# Patient Record
Sex: FEMALE | Race: WHITE | Employment: FULL TIME | URBAN - METROPOLITAN AREA
[De-identification: names, ages, dates, MRNs, and addresses within clinical notes are randomized per-mention and may not be internally consistent; named-entity substitution may affect disease eponyms.]

---

## 2017-01-04 ENCOUNTER — TELEPHONE (OUTPATIENT)
Dept: CARDIOLOGY CLINIC | Age: 57
End: 2017-01-04

## 2017-01-16 ENCOUNTER — OFFICE VISIT (OUTPATIENT)
Dept: CARDIOLOGY CLINIC | Age: 57
End: 2017-01-16

## 2017-01-16 ENCOUNTER — HOSPITAL ENCOUNTER (OUTPATIENT)
Dept: OTHER | Age: 57
Discharge: HOME | End: 2017-01-16
Attending: INTERNAL MEDICINE | Admitting: INTERNAL MEDICINE

## 2017-01-16 VITALS
WEIGHT: 228 LBS | BODY MASS INDEX: 40.4 KG/M2 | HEART RATE: 50 BPM | SYSTOLIC BLOOD PRESSURE: 110 MMHG | HEIGHT: 63 IN | DIASTOLIC BLOOD PRESSURE: 70 MMHG

## 2017-01-16 DIAGNOSIS — G47.33 OBSTRUCTIVE SLEEP APNEA SYNDROME: ICD-10-CM

## 2017-01-16 DIAGNOSIS — I48.91 ATRIAL FIBRILLATION, UNSPECIFIED TYPE (HCC): ICD-10-CM

## 2017-01-16 DIAGNOSIS — R53.83 FATIGUE, UNSPECIFIED TYPE: ICD-10-CM

## 2017-01-16 DIAGNOSIS — E78.2 MIXED HYPERLIPIDEMIA: ICD-10-CM

## 2017-01-16 DIAGNOSIS — I48.91 ATRIAL FIBRILLATION, UNSPECIFIED TYPE (HCC): Primary | ICD-10-CM

## 2017-01-16 LAB
ALT SERPL-CCNC: 26 U/L (ref 10–40)
ANION GAP SERPL CALCULATED.3IONS-SCNC: 15 MMOL/L (ref 3–16)
AST SERPL-CCNC: 14 U/L (ref 15–37)
BUN BLDV-MCNC: 23 MG/DL (ref 7–20)
CALCIUM SERPL-MCNC: 9.1 MG/DL (ref 8.3–10.6)
CHLORIDE BLD-SCNC: 102 MMOL/L (ref 99–110)
CO2: 26 MMOL/L (ref 21–32)
CREAT SERPL-MCNC: 1.4 MG/DL (ref 0.6–1.1)
GFR AFRICAN AMERICAN: 47
GFR NON-AFRICAN AMERICAN: 39
GLUCOSE BLD-MCNC: 90 MG/DL (ref 70–99)
POTASSIUM SERPL-SCNC: 4.1 MMOL/L (ref 3.5–5.1)
SODIUM BLD-SCNC: 143 MMOL/L (ref 136–145)
T4 FREE: 1.1 NG/DL (ref 0.9–1.8)
TSH REFLEX: 4.2 UIU/ML (ref 0.27–4.2)

## 2017-01-16 PROCEDURE — 99214 OFFICE O/P EST MOD 30 MIN: CPT | Performed by: INTERNAL MEDICINE

## 2017-01-16 PROCEDURE — 93000 ELECTROCARDIOGRAM COMPLETE: CPT | Performed by: INTERNAL MEDICINE

## 2017-01-16 RX ORDER — AMIODARONE HYDROCHLORIDE 200 MG/1
200 TABLET ORAL DAILY
Qty: 30 TABLET | Refills: 11 | Status: SHIPPED | OUTPATIENT
Start: 2017-01-16 | End: 2017-04-10 | Stop reason: SDUPTHER

## 2017-01-17 ENCOUNTER — TELEPHONE (OUTPATIENT)
Dept: CARDIOLOGY CLINIC | Age: 57
End: 2017-01-17

## 2017-01-17 DIAGNOSIS — Z79.899 ENCOUNTER FOR LONG-TERM (CURRENT) USE OF MEDICATIONS: ICD-10-CM

## 2017-01-17 DIAGNOSIS — I10 ESSENTIAL HYPERTENSION: Primary | Chronic | ICD-10-CM

## 2017-02-21 ENCOUNTER — HOSPITAL ENCOUNTER (OUTPATIENT)
Dept: OTHER | Age: 57
Discharge: HOME | End: 2017-02-21
Attending: INTERNAL MEDICINE | Admitting: INTERNAL MEDICINE

## 2017-02-21 DIAGNOSIS — Z79.899 ENCOUNTER FOR LONG-TERM (CURRENT) USE OF MEDICATIONS: ICD-10-CM

## 2017-02-21 DIAGNOSIS — I10 ESSENTIAL HYPERTENSION: Chronic | ICD-10-CM

## 2017-02-21 LAB
ANION GAP SERPL CALCULATED.3IONS-SCNC: 14 MMOL/L (ref 3–16)
BUN BLDV-MCNC: 21 MG/DL (ref 7–20)
CALCIUM SERPL-MCNC: 9.1 MG/DL (ref 8.3–10.6)
CHLORIDE BLD-SCNC: 99 MMOL/L (ref 99–110)
CO2: 24 MMOL/L (ref 21–32)
CREAT SERPL-MCNC: 0.8 MG/DL (ref 0.6–1.1)
GFR AFRICAN AMERICAN: >60
GFR NON-AFRICAN AMERICAN: >60
GLUCOSE BLD-MCNC: 89 MG/DL (ref 70–99)
POTASSIUM SERPL-SCNC: 4.3 MMOL/L (ref 3.5–5.1)
SODIUM BLD-SCNC: 137 MMOL/L (ref 136–145)

## 2017-02-22 ENCOUNTER — TELEPHONE (OUTPATIENT)
Dept: CARDIOLOGY CLINIC | Age: 57
End: 2017-02-22

## 2017-04-07 RX ORDER — PANTOPRAZOLE SODIUM 40 MG/1
TABLET, DELAYED RELEASE ORAL
Qty: 30 TABLET | Refills: 3 | Status: SHIPPED | OUTPATIENT
Start: 2017-04-07 | End: 2017-04-08 | Stop reason: SDUPTHER

## 2017-04-10 ENCOUNTER — OFFICE VISIT (OUTPATIENT)
Dept: CARDIOLOGY CLINIC | Age: 57
End: 2017-04-10

## 2017-04-10 VITALS
DIASTOLIC BLOOD PRESSURE: 78 MMHG | HEIGHT: 63 IN | OXYGEN SATURATION: 96 % | BODY MASS INDEX: 42.06 KG/M2 | WEIGHT: 237.4 LBS | HEART RATE: 58 BPM | SYSTOLIC BLOOD PRESSURE: 114 MMHG

## 2017-04-10 DIAGNOSIS — E66.01 MORBID OBESITY DUE TO EXCESS CALORIES (HCC): ICD-10-CM

## 2017-04-10 DIAGNOSIS — I48.0 PAROXYSMAL ATRIAL FIBRILLATION (HCC): Primary | Chronic | ICD-10-CM

## 2017-04-10 DIAGNOSIS — G47.33 OSA (OBSTRUCTIVE SLEEP APNEA): ICD-10-CM

## 2017-04-10 DIAGNOSIS — I48.92 ATRIAL FLUTTER, UNSPECIFIED TYPE (HCC): Chronic | ICD-10-CM

## 2017-04-10 DIAGNOSIS — E78.2 MIXED HYPERLIPIDEMIA: Chronic | ICD-10-CM

## 2017-04-10 PROCEDURE — 93000 ELECTROCARDIOGRAM COMPLETE: CPT | Performed by: INTERNAL MEDICINE

## 2017-04-10 PROCEDURE — 99214 OFFICE O/P EST MOD 30 MIN: CPT | Performed by: INTERNAL MEDICINE

## 2017-04-10 RX ORDER — AMIODARONE HYDROCHLORIDE 200 MG/1
100 TABLET ORAL DAILY
Qty: 30 TABLET | Refills: 11
Start: 2017-04-10 | End: 2017-07-10 | Stop reason: ALTCHOICE

## 2017-04-10 RX ORDER — PANTOPRAZOLE SODIUM 40 MG/1
TABLET, DELAYED RELEASE ORAL
Qty: 30 TABLET | Refills: 3 | Status: SHIPPED | OUTPATIENT
Start: 2017-04-10 | End: 2017-10-04 | Stop reason: SDUPTHER

## 2017-04-17 PROCEDURE — 93224 XTRNL ECG REC UP TO 48 HRS: CPT | Performed by: INTERNAL MEDICINE

## 2017-06-26 ENCOUNTER — OFFICE VISIT (OUTPATIENT)
Dept: CARDIOLOGY CLINIC | Age: 57
End: 2017-06-26

## 2017-06-26 VITALS
SYSTOLIC BLOOD PRESSURE: 138 MMHG | DIASTOLIC BLOOD PRESSURE: 80 MMHG | HEIGHT: 63 IN | HEART RATE: 64 BPM | WEIGHT: 240.13 LBS | BODY MASS INDEX: 42.55 KG/M2

## 2017-06-26 DIAGNOSIS — G47.33 OSA (OBSTRUCTIVE SLEEP APNEA): ICD-10-CM

## 2017-06-26 DIAGNOSIS — E78.2 MIXED HYPERLIPIDEMIA: Chronic | ICD-10-CM

## 2017-06-26 DIAGNOSIS — I48.0 PAROXYSMAL ATRIAL FIBRILLATION (HCC): Primary | Chronic | ICD-10-CM

## 2017-06-26 LAB
ALT SERPL-CCNC: 32 IU/L (ref 10–35)
AST SERPL-CCNC: 19 IU/L (ref 10–35)
BUN BLDV-MCNC: 20 MG/DL (ref 8–26)
CALCIUM SERPL-MCNC: 8.6 MG/DL (ref 8.4–10.2)
CHLORIDE BLD-SCNC: 103 MEQ/L (ref 101–111)
CO2: 24 MEQ/L (ref 22–29)
CREAT SERPL-MCNC: 0.97 MG/DL (ref 0.44–1.03)
GFR AFRICAN AMERICAN: >60 ML/MIN/1.73 SQ METER
GFR NON-AFRICAN AMERICAN: 59 ML/MIN/1.73 SQ METER
GLUCOSE BLD-MCNC: 97 MG/DL (ref 70–99)
OSMOLALITY CALCULATION: 269 MOSM/KG (ref 280–300)
POTASSIUM SERPL-SCNC: 3.8 MEQ/L (ref 3.6–5.1)
SODIUM BLD-SCNC: 138 MEQ/L (ref 135–145)
TSH ULTRASENSITIVE: 3.76 MIU/L (ref 0.27–4.2)

## 2017-06-26 PROCEDURE — 99213 OFFICE O/P EST LOW 20 MIN: CPT | Performed by: INTERNAL MEDICINE

## 2017-06-28 LAB
AMIODARONE, SERUM: 0.4 UG/ML (ref 0.5–2)
N-DESETHYL-AMIODARONE: 0.4 UG/ML

## 2017-07-03 ENCOUNTER — TELEPHONE (OUTPATIENT)
Dept: BARIATRICS/WEIGHT MGMT | Age: 57
End: 2017-07-03

## 2017-07-10 ENCOUNTER — OFFICE VISIT (OUTPATIENT)
Dept: CARDIOLOGY CLINIC | Age: 57
End: 2017-07-10

## 2017-07-10 VITALS
WEIGHT: 241 LBS | OXYGEN SATURATION: 95 % | HEIGHT: 63 IN | SYSTOLIC BLOOD PRESSURE: 112 MMHG | DIASTOLIC BLOOD PRESSURE: 68 MMHG | BODY MASS INDEX: 42.7 KG/M2 | HEART RATE: 64 BPM

## 2017-07-10 DIAGNOSIS — E66.01 MORBID OBESITY DUE TO EXCESS CALORIES (HCC): ICD-10-CM

## 2017-07-10 DIAGNOSIS — G47.33 OSA (OBSTRUCTIVE SLEEP APNEA): ICD-10-CM

## 2017-07-10 DIAGNOSIS — I48.0 PAROXYSMAL ATRIAL FIBRILLATION (HCC): Primary | Chronic | ICD-10-CM

## 2017-07-10 DIAGNOSIS — I10 ESSENTIAL HYPERTENSION: Chronic | ICD-10-CM

## 2017-07-10 PROCEDURE — 99214 OFFICE O/P EST MOD 30 MIN: CPT | Performed by: INTERNAL MEDICINE

## 2017-07-10 PROCEDURE — 93000 ELECTROCARDIOGRAM COMPLETE: CPT | Performed by: INTERNAL MEDICINE

## 2017-07-10 RX ORDER — METOPROLOL TARTRATE 50 MG/1
TABLET, FILM COATED ORAL
Qty: 180 TABLET | Refills: 3 | Status: SHIPPED | OUTPATIENT
Start: 2017-07-10 | End: 2018-02-07 | Stop reason: SDUPTHER

## 2017-07-17 ENCOUNTER — OFFICE VISIT (OUTPATIENT)
Dept: BARIATRICS/WEIGHT MGMT | Age: 57
End: 2017-07-17

## 2017-07-17 VITALS
HEIGHT: 63 IN | WEIGHT: 242 LBS | SYSTOLIC BLOOD PRESSURE: 124 MMHG | BODY MASS INDEX: 42.88 KG/M2 | HEART RATE: 60 BPM | DIASTOLIC BLOOD PRESSURE: 80 MMHG

## 2017-07-17 DIAGNOSIS — I10 HTN (HYPERTENSION), BENIGN: ICD-10-CM

## 2017-07-17 DIAGNOSIS — I48.0 PAROXYSMAL ATRIAL FIBRILLATION (HCC): ICD-10-CM

## 2017-07-17 DIAGNOSIS — E66.01 MORBID OBESITY WITH BMI OF 40.0-44.9, ADULT (HCC): Primary | ICD-10-CM

## 2017-07-17 DIAGNOSIS — G47.33 OBSTRUCTIVE SLEEP APNEA: ICD-10-CM

## 2017-07-17 PROCEDURE — 99204 OFFICE O/P NEW MOD 45 MIN: CPT | Performed by: FAMILY MEDICINE

## 2017-07-17 ASSESSMENT — ENCOUNTER SYMPTOMS
RESPIRATORY NEGATIVE: 1
GASTROINTESTINAL NEGATIVE: 1
EYES NEGATIVE: 1

## 2017-07-27 ENCOUNTER — TELEPHONE (OUTPATIENT)
Dept: BARIATRICS/WEIGHT MGMT | Age: 57
End: 2017-07-27

## 2017-07-27 DIAGNOSIS — R73.03 PREDIABETES: ICD-10-CM

## 2017-07-27 DIAGNOSIS — E55.9 VITAMIN D DEFICIENCY: Primary | ICD-10-CM

## 2017-07-27 RX ORDER — ERGOCALCIFEROL 1.25 MG/1
50000 CAPSULE ORAL WEEKLY
Qty: 8 CAPSULE | Refills: 0 | Status: SHIPPED | OUTPATIENT
Start: 2017-07-27 | End: 2017-10-06

## 2017-08-21 ENCOUNTER — OFFICE VISIT (OUTPATIENT)
Dept: BARIATRICS/WEIGHT MGMT | Age: 57
End: 2017-08-21

## 2017-08-21 VITALS
HEART RATE: 64 BPM | WEIGHT: 243.5 LBS | HEIGHT: 63 IN | DIASTOLIC BLOOD PRESSURE: 80 MMHG | SYSTOLIC BLOOD PRESSURE: 128 MMHG | BODY MASS INDEX: 43.14 KG/M2

## 2017-08-21 DIAGNOSIS — R73.03 PREDIABETES: ICD-10-CM

## 2017-08-21 DIAGNOSIS — Z71.3 DIETARY COUNSELING AND SURVEILLANCE: ICD-10-CM

## 2017-08-21 DIAGNOSIS — E55.9 VITAMIN D DEFICIENCY: ICD-10-CM

## 2017-08-21 DIAGNOSIS — E66.01 MORBID OBESITY WITH BMI OF 40.0-44.9, ADULT (HCC): Primary | ICD-10-CM

## 2017-08-21 PROCEDURE — 99214 OFFICE O/P EST MOD 30 MIN: CPT | Performed by: FAMILY MEDICINE

## 2017-08-21 ASSESSMENT — ENCOUNTER SYMPTOMS
EYES NEGATIVE: 1
RESPIRATORY NEGATIVE: 1
GASTROINTESTINAL NEGATIVE: 1

## 2017-10-04 DIAGNOSIS — E78.2 MIXED HYPERLIPIDEMIA: Primary | Chronic | ICD-10-CM

## 2017-10-05 RX ORDER — PANTOPRAZOLE SODIUM 40 MG/1
40 TABLET, DELAYED RELEASE ORAL DAILY
Qty: 90 TABLET | Refills: 3 | Status: SHIPPED | OUTPATIENT
Start: 2017-10-05 | End: 2018-09-23 | Stop reason: SDUPTHER

## 2017-10-06 ENCOUNTER — OFFICE VISIT (OUTPATIENT)
Dept: BARIATRICS/WEIGHT MGMT | Age: 57
End: 2017-10-06

## 2017-10-06 VITALS
WEIGHT: 236.5 LBS | HEIGHT: 63 IN | BODY MASS INDEX: 41.9 KG/M2 | DIASTOLIC BLOOD PRESSURE: 80 MMHG | HEART RATE: 56 BPM | SYSTOLIC BLOOD PRESSURE: 122 MMHG

## 2017-10-06 DIAGNOSIS — Z71.3 DIETARY COUNSELING AND SURVEILLANCE: ICD-10-CM

## 2017-10-06 DIAGNOSIS — E66.01 MORBID OBESITY WITH BMI OF 40.0-44.9, ADULT (HCC): Primary | ICD-10-CM

## 2017-10-06 PROCEDURE — 99213 OFFICE O/P EST LOW 20 MIN: CPT | Performed by: FAMILY MEDICINE

## 2017-10-06 RX ORDER — MULTIVIT-MIN/IRON/FOLIC ACID/K 18-600-40
1 CAPSULE ORAL DAILY
COMMUNITY

## 2017-10-06 ASSESSMENT — ENCOUNTER SYMPTOMS
EYES NEGATIVE: 1
RESPIRATORY NEGATIVE: 1
GASTROINTESTINAL NEGATIVE: 1

## 2017-10-06 NOTE — PATIENT INSTRUCTIONS
Learning About Obesity  What is obesity? Obesity means having so much body fat that your health is in danger. Having too much body fat can lead to type 2 diabetes, heart disease, high blood pressure, arthritis, sleep apnea, and stroke. Even if you don't feel bad now, think about these health risks. Do they seem like a good reason to start on a new path toward a healthier weight? Or do you have another personal, powerful reason for wanting to lose weight? Whatever it is, keep it in mind. It can be hard to change eating habits and exercise habits. But with your own reason and plan, you can do it. How do you know if your weight is in the obesity range? To know if your weight is in the obesity range, your doctor looks at your body mass index (BMI) and waist size. Your BMI is a number that is calculated from your weight and your height. To figure your BMI for yourself, get a BMI table from your doctor or use an online tool, such as http://www.dumont.com/ on the [employer] of [employer]. What causes obesity? When you take in more calories than you burn off, you gain weight. How you eat, how active you are, and other things affect how your body uses calories and whether you gain weight. If you have family members who have too much body fat, you may have inherited a tendency to gain weight. And your family also helps form your eating and lifestyle habits, which can lead to obesity. Also, our busy lives make it harder to plan and cook healthy meals. For many of us, it's easier to reach for prepared foods, go out to eat, or go to the drive-through. But these foods are often high in saturated fat and calories. Portions are often too large. What can you do to reach a healthy weight? Focus on health, not diets. Diets are hard to stay on and don't work in the long run.  It is very hard to stay with a diet that includes lots of big changes in your eating

## 2017-10-06 NOTE — PROGRESS NOTES
Patient: Stan Monet                      Encounter Date: 10/6/2017    YOB: 1960               Age: 60 y.o. Chief Complaint   Patient presents with    Weight Management     3rd MWM, Optifast       /80  Pulse 56  Ht 5' 3\" (1.6 m)  Wt 236 lb 8 oz (107.3 kg)  BMI 41.89 kg/m2    Body mass index is 41.89 kg/(m^2). Waist Circumference  Waist (Inches): 55 in    HPI: 60 y.o. female with a long-standing history of obesity presents today for follow-up. She has lost 7 pounds since her last visit in August. She did not like OPTIFAST (had issues with the smell and taste). Would like to know medication options. Met with the dietitian today. Food recall and assessment reviewed with the patient. Exercise: []Cardio     []Resistance/strength training     [x]Other: No intentional exercise, but trying to be physically active     Allergies   Allergen Reactions    Vicodin [Hydrocodone-Acetaminophen] Nausea And Vomiting         Current Outpatient Prescriptions:     pantoprazole (PROTONIX) 40 MG tablet, Take 1 tablet by mouth daily, Disp: 90 tablet, Rfl: 3    Probiotic Product (PROBIOTIC PO), Take by mouth, Disp: , Rfl:     metoprolol tartrate (LOPRESSOR) 50 MG tablet, TAKE 1 TABLET BY MOUTH 2 TIMES DAILY. , Disp: 180 tablet, Rfl: 3    XARELTO 20 MG TABS tablet, TAKE 1 TABLET BY MOUTH DAILY, Disp: 30 tablet, Rfl: 11    ALPRAZolam (XANAX) 0.25 MG tablet, Take 0.25 mg by mouth daily as needed , Disp: , Rfl:     sertraline (ZOLOFT) 50 MG tablet, Take 50 mg by mouth daily , Disp: , Rfl:     simvastatin (ZOCOR) 40 MG tablet, Take 40 mg by mouth nightly.  , Disp: , Rfl:     Cholecalciferol (VITAMIN D) 2000 units CAPS capsule, Take 1 capsule by mouth daily, Disp: , Rfl:     Patient Active Problem List   Diagnosis    Essential hypertension    SUSIE (obstructive sleep apnea)    Paroxysmal atrial fibrillation (HCC)    Hyperlipidemia    Atrial flutter (HCC)    Obesity due to excess calories    Vitamin D deficiency    Prediabetes       Review of Systems   HENT: Negative. Eyes: Negative. Respiratory: Negative. Cardiovascular: Negative. Gastrointestinal: Negative. Endocrine: Negative. Musculoskeletal: Negative. Neurological: Negative. Psychiatric/Behavioral: Negative. Physical Exam   Constitutional: She is oriented to person, place, and time. She appears well-developed and well-nourished. Cardiovascular: Normal rate, regular rhythm and normal heart sounds. Pulmonary/Chest: Effort normal and breath sounds normal. No respiratory distress. She has no wheezes. She has no rales. Neurological: She is alert and oriented to person, place, and time. Skin: Skin is warm and dry. Psychiatric: She has a normal mood and affect. Her behavior is normal. Judgment and thought content normal.       Office Visit on 06/26/2017   Component Date Value Ref Range Status    ALT 06/26/2017 32  10 - 35 IU/L Final    AST 06/26/2017 19  10 - 35 IU/L Final    BUN 06/26/2017 20  8 - 26 mg/dL Final    Sodium 06/26/2017 138  135 - 145 mEq/L Final    Potassium 06/26/2017 3.8  3.6 - 5.1 mEq/L Final    Chloride 06/26/2017 103  101 - 111 mEq/L Final    CO2 06/26/2017 24  22 - 29 mEq/L Final    Glucose 06/26/2017 97  70 - 99 mg/dL Final    CREATININE 06/26/2017 0.97  0.44 - 1.03 mg/dL Final    Comment: Results of Creatinine results may be falsely decreased when run with urine or blood samples   collected before sufficient time has lapsed after the administration of either Acetaminophen   or N-acetylcysteine.  Osmolality Calc 06/26/2017 269* 280 - 300 mOSM/kg Final    Calcium 06/26/2017 8.6  8.4 - 10.2 mg/dL Final    GFR Non- 06/26/2017 59* >60 mL/min/1.73 sq meter Final    GFR  06/26/2017 >60  >60 mL/min/1.73 sq meter Final    Comment: The estimated GFR was calculated using the IDMS-Traceable MDRD Study Equation.  This equation   should only be used for (low carb/low-debby)               [] Low-calorie diet    []Maintenance       []Other    Exercise: [x]Cardio     [x]Resistance/strength training                       [x]ACSM recommendations (150 minutes/week in active weight loss)                              Behavior: [x]Motivational interviewing performed    [] Referral for counseling                         [x]Discussed strategies to overcome habits/challenges for focus         [] Stress management   [x] Stimulus control    Reviewed:  [x] Nutrition and the importance of regular protein intake  [x] Hidden carbohydrate sources  [x] Alcohol use  [x] Tobacco use   [x] Importance of exercise and reducing sedentary time      No orders of the defined types were placed in this encounter. No Follow-up on file. This dictation was performed with a verbal recognition program (Dragon) and all efforts were made to ensure accuracy of this dictation. It is possible that there are still dictated errors within this note. If so, please bring any errors to my attention for correction.

## 2017-10-06 NOTE — MR AVS SNAPSHOT
After Visit Summary             Mounika Mast   10/6/2017 10:30 AM   Office Visit    Description:  Female : 1960   Provider:  Elena Webber MD   Department:  [facility]              Your Follow-Up and Future Appointments         Below is a list of your follow-up and future appointments. This may not be a complete list as you may have made appointments directly with providers that we are not aware of or your providers may have made some for you. Please call your providers to confirm appointments. It is important to keep your appointments. Please bring your current insurance card, photo ID, co-pay, and all medication bottles to your appointment. If self-pay, payment is expected at the time of service. Your To-Do List     Future Appointments Provider Department Dept Phone    10/10/2017 1:15 PM Naseem Ocasio MD [address] [place] [address] [phone]    Please arrive 15 minutes prior to appointment, bring photo ID and insurance card. 2017 4:45 PM Elena Webber MD [facility] [phone]    Please arrive 15 minutes prior to appointment time, bring insurance card and photo ID. Follow-Up    Return in about 6 weeks (around 2017). Information from Your Visit        Department     Name Address Phone Fax    [address] [place] [address] [employer] [address] [address] [address] [place] [address],Third Floor [phone]      You Were Seen for:         Comments    Morbid obesity with BMI of 40.0-44.9, adult [facility])   [[id number]]         Vital Signs     Blood Pressure Pulse Height Weight Body Mass Index Smoking Status    122/80 56 5' 3\" (1.6 m) 236 lb 8 oz (107.3 kg) 41.89 kg/m2 Former Smoker      Additional Information about your Body Mass Index (BMI)           Your BMI as listed above is considered obese (30 or more). BMI is an estimate of body fat, calculated from your height and weight.   The higher your BMI, the greater your risk of heart disease, high blood pressure, type 2 diabetes, stroke, gallstones, arthritis, sleep apnea, and certain cancers. BMI is not perfect. It may overestimate body fat in athletes and people who are more muscular. Even a small weight loss (between 5 and 10 percent of your current weight) by decreasing your calorie intake and becoming more physically active will help lower your risk of developing or worsening diseases associated with obesity. Learn more at: [employer].uk          Instructions         Learning About Obesity  What is obesity? Obesity means having so much body fat that your health is in danger. Having too much body fat can lead to type 2 diabetes, heart disease, high blood pressure, arthritis, sleep apnea, and stroke. Even if you don't feel bad now, think about these health risks. Do they seem like a good reason to start on a new path toward a healthier weight? Or do you have another personal, powerful reason for wanting to lose weight? Whatever it is, keep it in mind. It can be hard to change eating habits and exercise habits. But with your own reason and plan, you can do it. How do you know if your weight is in the obesity range? To know if your weight is in the obesity range, your doctor looks at your body mass index (BMI) and waist size. Your BMI is a number that is calculated from your weight and your height. To figure your BMI for yourself, get a BMI table from your doctor or use an online tool, such as http://www.[employer]ing Systems/ on the Automatic Data of [employer]. What causes obesity? When you take in more calories than you burn off, you gain weight. How you eat, how active you are, and other things affect how your body uses calories and whether you gain weight.   If you have family members who have too much body fat, you may have daily candy bar to a piece of fruit, walk 10 minutes more, or add more vegetables to a meal.  Line up your support people. Make sure you're not going to be alone as you make this change. Connect with people who understand how important it is to you. Ask family members and friends for help in keeping with your plan. And think about who could make it harder for you, and how to handle them. Try tracking. People who keep track of what they eat, feel, and do are better at losing weight. Try writing down things like:  · What and how much you eat. · How you feel before and after each meal.  · Details about each meal (like eating out or at home, eating alone, or with friends or family). · What you do to be active. Look and plan. As you track, look for patterns that you may want to change. Take note of:  · When you eat and whether you skip meals. · How often you eat out. · How many fruits and vegetables you eat. · When you eat beyond feeling full. · When and why you eat for reasons other than being hungry. When you stray from your plan, don't get upset. Figure out what made you slip up and how you can fix it. Can you take medicines or have surgery to lose weight? Before your doctor will prescribe medicines or surgery, he or she will probably want you to be more active and follow your healthy eating plan for a period of time. These habits are key lifelong changes for managing your weight, with or without other medical treatment. And these changes can help you avoid weight-related health problems. Follow-up care is a key part of your treatment and safety. Be sure to make and go to all appointments, and call your doctor if you are having problems. It's also a good idea to know your test results and keep a list of the medicines you take. Where can you learn more? Go to https://tex.MediKeeper. org and sign in to your [employer] Insight account.  Enter N111 in the AgeCheq box to learn more about \"Learning About Obesity. \"     If you do not have an account, please click on the \"Sign Up Now\" link. Current as of: October 13, 2016  Content Version: 11.3  © 6618-7263 [employer]. Care instructions adapted under license by [facility] ([facility]). If you have questions about a medical condition or this instruction, always ask your healthcare professional. [address] any warranty or liability for your use of this information. Today's Medication Changes          These changes are accurate as of: 10/6/17  1:04 PM.  If you have any questions, ask your nurse or doctor. STOP taking these medications           vitamin D 01859 units Caps capsule   Commonly known as:  ERGOCALCIFEROL   Stopped by:  Jennifer Vasquez MD               Your Current Medications Are              pantoprazole (PROTONIX) 40 MG tablet Take 1 tablet by mouth daily    Probiotic Product (PROBIOTIC PO) Take by mouth    metoprolol tartrate (LOPRESSOR) 50 MG tablet TAKE 1 TABLET BY MOUTH 2 TIMES DAILY. XARELTO 20 MG TABS tablet TAKE 1 TABLET BY MOUTH DAILY    ALPRAZolam (XANAX) 0.25 MG tablet Take 0.25 mg by mouth daily as needed     sertraline (ZOLOFT) 50 MG tablet Take 50 mg by mouth daily     simvastatin (ZOCOR) 40 MG tablet Take 40 mg by mouth nightly.       Cholecalciferol (VITAMIN D) 2000 units CAPS capsule Take 1 capsule by mouth daily      Allergies              Vicodin [Hydrocodone-acetaminophen] Nausea And Vomiting         Additional Information        Basic Information     Date Of Birth Sex Race Ethnicity Preferred Language    1960 Female White Non-/Non  English      Problem List as of 10/6/2017  Date Reviewed: 10/6/2017                Vitamin D deficiency    Prediabetes    Atrial flutter ([facility] [address] (Chronic)    Obesity due to excess calories    Essential hypertension (Chronic)    SUSIE (obstructive sleep apnea)    Paroxysmal atrial fibrillation (HCC) (Chronic) Hyperlipidemia (Chronic)      Preventive Care        Date Due    Hepatitis C screening is recommended for all adults regardless of risk factors born between [facility] at least once (lifetime) who have never been tested. 1960    HIV screening is recommended for all people regardless of risk factors  aged 15-65 years at least once (lifetime) who have never been HIV tested. 12/14/1973    Tetanus Combination Vaccine (1 - Tdap) 12/14/1977    Pap Smear 12/14/1979    Mammograms are recommended every 2 years for low/average risk patients aged 48 - 69, and every year for high risk patients per updated national guidelines. However these guidelines can be individualized by your provider. 12/14/2008    Colonoscopy 12/14/2008    Yearly Flu Vaccine (1) 9/1/2017    Cholesterol Screening 6/2/2021            TYT ([employer] Young Turks)t Signup           Our records indicate that you have an active [employer] account. You can view your After Visit Summary by going to [email or web address] and logging in with your [employer] username and password. If you don't have a [employer] username and password but a parent or guardian has access to your record, the parent or guardian should login with their own [employer] username and password and access your record to view the After Visit Summary. Additional Information  If you have questions, please contact the physician practice where you receive care. Remember, [employer] is NOT to be used for urgent needs. For medical emergencies, dial 911. For questions regarding your [employer] account call [phone]. If you have a clinical question, please call your doctor's office.

## 2017-10-06 NOTE — PROGRESS NOTES
Lobo Major lost 7 lbs over 1 month. Pt states she does not like the taste of the Optifast and wants to switch to medication if possible. Pt states she is struggling with portions. Current treatment plan:   Patient is not on medication. Negative side effects from medications? no  Patient is on Optifast.   Patient is not on diet and exercise only plan. Treatment plan details: Optifast 4:1 - Pt states she followed this for 2.5 weeks and then started struggling and overeating at her meal    Is patient adhering to diet/meal plan?: No    Carbohydrate consumption: pt not tracking    Breakfast: 7 am - Atkins shake OR Quest bar     Snack: none    Lunch: 10 am - optifast soup OR atkins shake     Snack: 2 pm - apple OR atkins shake     Dinner: salad with protein and zucchini  OR dines out and has large salad with protein     Snack: Atkins/Optifast/Quest bar OR none     Consuming at least 64oz of calorie free fluids? Yes    Participating in intentional exercise? Yes Details: Walks her small dog 4 x week     Plan/Goals:  Work on decreasing portion sizes,  Work on getting more variety in your diet     Handouts: 1200 kcal meal plan    Liza Lambert

## 2017-10-10 ENCOUNTER — OFFICE VISIT (OUTPATIENT)
Dept: CARDIOLOGY CLINIC | Age: 57
End: 2017-10-10

## 2017-10-10 VITALS
OXYGEN SATURATION: 96 % | SYSTOLIC BLOOD PRESSURE: 130 MMHG | WEIGHT: 237.8 LBS | DIASTOLIC BLOOD PRESSURE: 70 MMHG | HEART RATE: 60 BPM | BODY MASS INDEX: 42.13 KG/M2 | HEIGHT: 63 IN

## 2017-10-10 DIAGNOSIS — E66.01 MORBID OBESITY DUE TO EXCESS CALORIES (HCC): ICD-10-CM

## 2017-10-10 DIAGNOSIS — I10 ESSENTIAL HYPERTENSION: Chronic | ICD-10-CM

## 2017-10-10 DIAGNOSIS — I48.92 ATRIAL FLUTTER, UNSPECIFIED TYPE (HCC): Chronic | ICD-10-CM

## 2017-10-10 DIAGNOSIS — I48.0 PAROXYSMAL ATRIAL FIBRILLATION (HCC): Primary | Chronic | ICD-10-CM

## 2017-10-10 DIAGNOSIS — G47.33 OSA (OBSTRUCTIVE SLEEP APNEA): ICD-10-CM

## 2017-10-10 PROCEDURE — 99214 OFFICE O/P EST MOD 30 MIN: CPT | Performed by: INTERNAL MEDICINE

## 2017-10-10 PROCEDURE — 93000 ELECTROCARDIOGRAM COMPLETE: CPT | Performed by: INTERNAL MEDICINE

## 2017-10-10 NOTE — PROGRESS NOTES
[facility]   Electrophysiology Follow up  Date: 10/10/2017    CC: Follow up for atrial fibrillation  HPI: Mounika Mast is a 60 y.o. History of paroxysmal atrial fibrillation. Atrial fib first started about around 2010, and she was symptomatic with it. Treated with Rythmol but continued having symptoms. On 12/15/16 - s/p ablation of Atrial fib with PVI, ablation of CVTI atrial flutter. 48 hr holter monitor 4/10/17 > SR with avg HR 61 (). Antiarrhythmic therapy Amiodarone was discontinued last visit in July. Interval history: Today she questions whether she still needs to take Protonix prescribed after ablation. She denies GI issues currently. She will switch off Zoloft to Contrave to help with weight loss. She follows with [facility]. She has lost ~ 7 lbs so far. She denies bleeding or unusual bruising on Xarelto. She avoids working with glass when on NOAC. She denies any palpitation, chest pain, SOB. Past Medical History:   Diagnosis Date    A-fib ([address]     Anxiety     COPD (chronic obstructive pulmonary disease) (HCC)     Depression     Heart palpitations     Hyperlipidemia     Hypertension     Sleep apnea     Urinary incontinence         Past Surgical History:   Procedure Laterality Date    ATRIAL ABLATION SURGERY      BREAST BIOPSY      CARPAL TUNNEL RELEASE      CYST REMOVAL      both breast    THORACOTOMY         Allergies   Allergen Reactions    Vicodin [Hydrocodone-Acetaminophen] Nausea And Vomiting       Social History:   reports that she quit smoking about 7 years ago. She has never used smokeless tobacco. She reports that she does not drink alcohol or use drugs.      Family History:  family history includes Cancer in her maternal grandfather, paternal grandfather, and sister; Diabetes in her maternal grandmother and mother; Glaucoma in her father; Heart Disease in her mother; High Blood Pressure in her mother; High Cholesterol in her brother, mother, and sister; Stroke in her mother. Review of System:    · General: negative for fever, chills    · Ophthalmic ROS: negative for - eye pain or loss of vision  · ENT ROS: negative for - headaches, sore throat   · Respiratory: negative for - cough, sputum    · Cardiovascular: Reviewed in HPI. · Gastrointestinal: negative for - abdominal pain, diarrhea, N/V  · Hematology: negative for - bleeding, blood clots, bruising or jaundice  · Genito-Urinary:  negative for - Dysuria or incontinence  · Musculoskeletal: negative for - Joint swelling, muscle pain  · Neurological: negative for - confusion, dizziness, headaches   · Psychiatric: No anxiety, no depression. · Dermatological: negative for - rash    Physical Examination:  Vitals:    10/10/17 1307   BP: 130/70   Pulse: 60   SpO2: 96%       · Constitutional: Oriented. No distress. · Head: Normocephalic and atraumatic. · Mouth/Throat: Oropharynx is clear and moist.   · Eyes: Conjunctivae normal. EOM are normal.   · Neck: Normal range of motion. No JVD present. · Cardiovascular: Normal rate, regular rhythm, S1&S2 without murmur, gallop, or rub  · Pulmonary/Chest: Bilateral respiratory sounds. No wheezes. No rhonchi. · Abdominal: Soft. Bowel sounds present. + obese   · Musculoskeletal: No tenderness. No edema    · Lymphadenopathy: Has no cervical adenopathy. · Neurological: Alert and oriented. No Gross deficit   · Skin: Skin is warm and dry. No rash noted. · Psychiatric: Has a normal behavior     Labs:  Reviewed. ECG:  Sinus rhythm, 56 bpm   Echo/stress: 2013  Echo   Baseline echocardiogram shows normal LV function with an ejection fraction   of 55%.  Following exercise there was uniform augmentation of all segments   with appropriate hyperdynamic response to exercise.    ECG   Normal sinus rhythm.   Non-diagnostic due to baseline abnormalities.    Arrhythmias   No arrhythmias were observed during the test.     Medication:  Current Outpatient Prescriptions   Medication Sig Dispense Refill    Cholecalciferol (VITAMIN D) 2000 units CAPS capsule Take 1 capsule by mouth daily      pantoprazole (PROTONIX) 40 MG tablet Take 1 tablet by mouth daily 90 tablet 3    Probiotic Product (PROBIOTIC PO) Take by mouth      metoprolol tartrate (LOPRESSOR) 50 MG tablet TAKE 1 TABLET BY MOUTH 2 TIMES DAILY. 180 tablet 3    XARELTO 20 MG TABS tablet TAKE 1 TABLET BY MOUTH DAILY 30 tablet 11    ALPRAZolam (XANAX) 0.25 MG tablet Take 0.25 mg by mouth daily as needed       sertraline (ZOLOFT) 50 MG tablet Take 50 mg by mouth daily       simvastatin (ZOCOR) 40 MG tablet Take 40 mg by mouth nightly. No current facility-administered medications for this visit. Patient Active Problem List    Diagnosis Date Noted    Vitamin D deficiency 07/27/2017    Prediabetes 07/27/2017    Atrial flutter ([facility] [address]     Obesity due to excess calories     Essential hypertension 05/13/2011    SUSIE (obstructive sleep apnea) 05/13/2011    Paroxysmal atrial fibrillation ([facility] [address] 05/13/2011    Hyperlipidemia 05/13/2011        Assessment and plan:   - Paroxysmal atrial fibrillation   - Off antiarrhythmic therapy and remains in sinus rhythm. - On 12/15/16 - underwent A fib ablation with PVI, ablation of CVTI atrial flutter   - She has had a few episodes of before however since weight loss, she is doing better. She states that her episodes are not as bothersome as before. She used to have jaw pain and felt poorly with her episodes. - Metoprolol to 50 bid. -High [id number] score and high risk for stroke and thromboembolism. Anticoagulation is recommended. Continue Xarelto 20 mg daily.       - I have discussed redo ablation if recurrent symptomatic Afib with more episodes. - Aggressive risk factor modifications particularly weight loss recommended. HTN:  Stable. Continue current medications.     - SUSIE:    - Use CPAP.      -

## 2017-10-10 NOTE — LETTER
[address] [address] [address] [address]  Phone: [phone]  Fax: [phone]    Pawan Chapin MD        October 10, 2017     Norah Ocampo [address] [address]    Patient: Oanh Ramesh  MR Number: [id number]  YOB: 1960  Date of Visit: 10/10/2017    Dear Dr. Ariel Renee:    Below are the relevant portions of my assessment and plan of care. [employer]   Electrophysiology Follow up  Date: 10/10/2017    CC: Follow up for atrial fibrillation  HPI: Oanh Ramesh is a 60 y.o. History of paroxysmal atrial fibrillation. Atrial fib first started about around 2010, and she was symptomatic with it. Treated with Rythmol but continued having symptoms. On 12/15/16 - s/p ablation of Atrial fib with PVI, ablation of CVTI atrial flutter. 48 hr holter monitor 4/10/17 > SR with avg HR 61 (). Antiarrhythmic therapy Amiodarone was discontinued last visit in July. Interval history: Today she questions whether she still needs to take Protonix prescribed after ablation. She denies GI issues currently. She will switch off Zoloft to Contrave to help with weight loss. She follows with [facility]. She has lost ~ 7 lbs so far. She denies bleeding or unusual bruising on Xarelto. She avoids working with glass when on NOAC. She denies any palpitation, chest pain, SOB.        Past Medical History:   Diagnosis Date    A-fib ([address]     Anxiety     COPD (chronic obstructive pulmonary disease) [facility]     Depression     Heart palpitations     Hyperlipidemia     Hypertension     Sleep apnea     Urinary incontinence         Past Surgical History:   Procedure Laterality Date    ATRIAL ABLATION SURGERY      BREAST BIOPSY      CARPAL TUNNEL RELEASE      CYST REMOVAL      both breast    THORACOTOMY         Allergies   Allergen Reactions doing better. She states that her episodes are not as bothersome as before. She used to have jaw pain and felt poorly with her episodes. - Metoprolol to 50 bid. -High [id number] score and high risk for stroke and thromboembolism. Anticoagulation is recommended. Continue Xarelto 20 mg daily.       - I have discussed redo ablation if recurrent symptomatic Afib with more episodes. - Aggressive risk factor modifications particularly weight loss recommended. HTN:  Stable. Continue current medications.     - SUSIE:    - Use CPAP. - Morbid Obesity: Body mass index is 42.12 kg/m². - Nathal[address] [facility] management and have lost weight.       - FU in 6 months. Thank you for allowing me to participate in the care of Ivan Ulrich. Further evaluation will be based upon the patient's clinical course and testing results. All questions and concerns were addressed to the patient/family. Alternatives to my treatment were discussed. I have discussed the above stated plan and the patient verbalized understanding and agreed with the plan. Jose Washington MD, MPH  [facility]   Office: [phone]         If you have questions, please do not hesitate to call me. I look forward to following Shelbie Schneider along with you.     Sincerely,        Jose Washington MD

## 2017-10-10 NOTE — COMMUNICATION BODY
[facility]   Electrophysiology Follow up  Date: 10/10/2017    CC: Follow up for atrial fibrillation  HPI: Blossom Mendez is a 60 y.o. History of paroxysmal atrial fibrillation. Atrial fib first started about around 2010, and she was symptomatic with it. Treated with Rythmol but continued having symptoms. On 12/15/16 - s/p ablation of Atrial fib with PVI, ablation of CVTI atrial flutter. 48 hr holter monitor 4/10/17 > SR with avg HR 61 (). Antiarrhythmic therapy Amiodarone was discontinued last visit in July. Interval history: Today she questions whether she still needs to take Protonix prescribed after ablation. She denies GI issues currently. She will switch off Zoloft to Contrave to help with weight loss. She follows with [address] [place] [address] [facility] Management clinic. She has lost ~ 7 lbs so far. She denies bleeding or unusual bruising on Xarelto. She avoids working with glass when on NOAC. She denies any palpitation, chest pain, SOB. Past Medical History:   Diagnosis Date    A-fib ([address]     Anxiety     COPD (chronic obstructive pulmonary disease) (HCC)     Depression     Heart palpitations     Hyperlipidemia     Hypertension     Sleep apnea     Urinary incontinence         Past Surgical History:   Procedure Laterality Date    ATRIAL ABLATION SURGERY      BREAST BIOPSY      CARPAL TUNNEL RELEASE      CYST REMOVAL      both breast    THORACOTOMY         Allergies   Allergen Reactions    Vicodin [Hydrocodone-Acetaminophen] Nausea And Vomiting       Social History:   reports that she quit smoking about 7 years ago. She has never used smokeless tobacco. She reports that she does not drink alcohol or use drugs.      Family History:  family history includes Cancer in her maternal grandfather, paternal grandfather, and sister; Diabetes in her maternal grandmother and mother; Glaucoma in her father; Heart Disease in her mother; High Blood Pressure in her mother; High test.     Medication:  Current Outpatient Prescriptions   Medication Sig Dispense Refill    Cholecalciferol (VITAMIN D) 2000 units CAPS capsule Take 1 capsule by mouth daily      pantoprazole (PROTONIX) 40 MG tablet Take 1 tablet by mouth daily 90 tablet 3    Probiotic Product (PROBIOTIC PO) Take by mouth      metoprolol tartrate (LOPRESSOR) 50 MG tablet TAKE 1 TABLET BY MOUTH 2 TIMES DAILY. 180 tablet 3    XARELTO 20 MG TABS tablet TAKE 1 TABLET BY MOUTH DAILY 30 tablet 11    ALPRAZolam (XANAX) 0.25 MG tablet Take 0.25 mg by mouth daily as needed       sertraline (ZOLOFT) 50 MG tablet Take 50 mg by mouth daily       simvastatin (ZOCOR) 40 MG tablet Take 40 mg by mouth nightly. No current facility-administered medications for this visit. Patient Active Problem List    Diagnosis Date Noted    Vitamin D deficiency 07/27/2017    Prediabetes 07/27/2017    Atrial flutter ([facility] [facility]     Obesity due to excess calories     Essential hypertension 05/13/2011    SUSIE (obstructive sleep apnea) 05/13/2011    Paroxysmal atrial fibrillation ([facility] [address] 05/13/2011    Hyperlipidemia 05/13/2011        Assessment and plan:   - Paroxysmal atrial fibrillation   - Off antiarrhythmic therapy and remains in sinus rhythm. - On 12/15/16 - underwent A fib ablation with PVI, ablation of CVTI atrial flutter   - She has had a few episodes of before however since weight loss, she is doing better. She states that her episodes are not as bothersome as before. She used to have jaw pain and felt poorly with her episodes. - Metoprolol to 50 bid. -High [id number] score and high risk for stroke and thromboembolism. Anticoagulation is recommended. Continue Xarelto 20 mg daily.       - I have discussed redo ablation if recurrent symptomatic Afib with more episodes. - Aggressive risk factor modifications particularly weight loss recommended. HTN:  Stable. Continue current medications.     - SUSIE:    - Use CPAP.      - Morbid Obesity: Body mass index is 42.12 kg/m². - Nathalie [address] [facility] management and have lost weight.       - FU in 6 months. Thank you for allowing me to participate in the care of Oanh Ramesh. Further evaluation will be based upon the patient's clinical course and testing results. All questions and concerns were addressed to the patient/family. Alternatives to my treatment were discussed. I have discussed the above stated plan and the patient verbalized understanding and agreed with the plan.     Pawan Chapin MD, MPH  [address] [address]   Office: [phone]

## 2017-10-30 ENCOUNTER — TELEPHONE (OUTPATIENT)
Dept: BARIATRICS/WEIGHT MGMT | Age: 57
End: 2017-10-30

## 2017-10-30 NOTE — TELEPHONE ENCOUNTER
Patient states she is off Zoloft as of Saturday and would like to move her appt date up to Nov 8th if possible. She has an appt on Nov 20th but would like to get started. Pt states she is a  and may not be able to answer her phone but it's ok to leave a detailed message.

## 2017-11-09 ENCOUNTER — OFFICE VISIT (OUTPATIENT)
Dept: BARIATRICS/WEIGHT MGMT | Age: 57
End: 2017-11-09

## 2017-11-09 VITALS
WEIGHT: 239 LBS | HEART RATE: 69 BPM | DIASTOLIC BLOOD PRESSURE: 67 MMHG | HEIGHT: 63 IN | SYSTOLIC BLOOD PRESSURE: 109 MMHG | BODY MASS INDEX: 42.35 KG/M2

## 2017-11-09 DIAGNOSIS — E66.01 MORBID OBESITY WITH BMI OF 40.0-44.9, ADULT (HCC): ICD-10-CM

## 2017-11-09 PROCEDURE — 99213 OFFICE O/P EST LOW 20 MIN: CPT | Performed by: NURSE PRACTITIONER

## 2017-11-09 ASSESSMENT — ENCOUNTER SYMPTOMS
RESPIRATORY NEGATIVE: 1
EYES NEGATIVE: 1
GASTROINTESTINAL NEGATIVE: 1

## 2017-11-09 NOTE — PROGRESS NOTES
(a-fibb). Cardiologist aware she may begin Contrave. Exercise: []Cardio     []Resistance/strength training     [x]Other: none - plans to begin walking    Allergies   Allergen Reactions    Vicodin [Hydrocodone-Acetaminophen] Nausea And Vomiting         Current Outpatient Prescriptions:     rivaroxaban (XARELTO) 20 MG TABS tablet, TAKE 1 TABLET BY MOUTH DAILY, Disp: 90 tablet, Rfl: 3    Cholecalciferol (VITAMIN D) 2000 units CAPS capsule, Take 1 capsule by mouth daily, Disp: , Rfl:     pantoprazole (PROTONIX) 40 MG tablet, Take 1 tablet by mouth daily, Disp: 90 tablet, Rfl: 3    Probiotic Product (PROBIOTIC PO), Take by mouth, Disp: , Rfl:     metoprolol tartrate (LOPRESSOR) 50 MG tablet, TAKE 1 TABLET BY MOUTH 2 TIMES DAILY. , Disp: 180 tablet, Rfl: 3    ALPRAZolam (XANAX) 0.25 MG tablet, Take 0.25 mg by mouth daily as needed , Disp: , Rfl:     simvastatin (ZOCOR) 40 MG tablet, Take 40 mg by mouth nightly.  , Disp: , Rfl:     Patient Active Problem List   Diagnosis    Essential hypertension    SUSIE (obstructive sleep apnea)    Paroxysmal atrial fibrillation (HCC)    Hyperlipidemia    Atrial flutter (HCC)    Obesity due to excess calories    Vitamin D deficiency    Prediabetes    Morbid obesity with BMI of 40.0-44.9, adult ([facility] [address]       Review of Systems   Constitutional: Negative. HENT: Negative. Eyes: Negative. Respiratory: Negative. Cardiovascular: Negative. Gastrointestinal: Negative. Skin: Negative. Neurological: Negative. Physical Exam   Constitutional: She is oriented to person, place, and time. She appears well-developed and well-nourished. HENT:   Head: Normocephalic and atraumatic. Eyes: Pupils are equal, round, and reactive to light. Neck: Normal range of motion. Cardiovascular: Normal rate and regular rhythm. Pulmonary/Chest: Effort normal.   Musculoskeletal: Normal range of motion.    Neurological: She is alert and oriented to person, place, and hard to keep good dietary and behavior modifications. Treatment start date: 11/10/17  Starting Weight: 5  Todays weight: Weight: 239 lb (108.4 kg)   Weight loss since last visit: +2.5 pounds  12 week Goal: at least 5 % (11.9 pounds by 2/10/18)     Labs discussed and vitamin deficiencies addressed and/or prescriptions provided. Plan to repeat labs next month (December 2017), as it will be 6 months since her last labs were completed. I have spent 15 minutes counseling the patient and addressing the patients questions and concerns as well reviewing labs and/or other studies. More than 50% was face to face time counseling the patient.      Patient will follow up in 2 weeks.

## 2017-11-09 NOTE — PROGRESS NOTES
Stan Monet gained 2.5 lbs over past month. Current treatment plan:   Patient is not on medication. Negative side effects from medications? no  Patient is not on Optifast.   Patient is  on diet and exercise only plan. Treatment plan details:  1200 calorie LC    Is patient adhering to diet/meal plan- no states she has stress and it has been difficult hoping contrave will give her the boost she needs    Carbohydrate consumption: not sure    Breakfast: 7a 2 boiled egg w/ butter & munoz powder & small apple OR tangerine    Snack: none    Lunch: 945 OR 10a - salad (lettuce/tomato w/ shrimp OR chicken salad) ( close to 2 tb vinegrette OR Dav dressing) w/ pc of fruit OR carbmaster yogurt    Snack: 315-330 - over hungry - pull in drive thru - cup of chicken nuggets OR something    Dinner: 430-5p - fish OR meat w/ veggie & salad & pc of fruit     Snack: when good no - but last week every single night - chips/cookies etc    Consuming at least 64oz of calorie free fluids? Yes - A lot of water & flavor water & caffeine free diet tea - has eliminated soda - some coffee w/ stevia & SF creamer      Participating in intentional exercise? Not much - distance & stress - when she does exercise she likes yoga & pilates     Plan/Goals:   1. Tracking  2. Eliminate chips/cookies  3. Try protein bar for pm snack  4. Watch fruit - 2 servings fruit / at least 3 non-starchy veggies  5.   2x/week on stationary bike for 15 min    Handouts: protein bar    Tanya Crow

## 2017-11-09 NOTE — PATIENT INSTRUCTIONS
Key Low Carb, High Healthy Fat Dietary Points:    - Meats (preferably organic or grass fed) are great sources of protein and are low in carbohydrates. Tildon Ora with coconut, olive, avocado, or almond oils. - When buying dairy, choose regular or full fat options. - Choose vegetables that grow above ground as they are generally lower in carbohydrates. - Avoid bread, rice, potatoes, pasta and all sources of simple sugars (desserts, soda, breakfast cereals). - Choose beverages that are calorie and sugar free, such as water or flavored landaverde. - When eating fruit, choose berries as a snack option a few times a week. Reminder regarding weight loss medications: You must be seen in office every 2-4 weeks to have your prescriptions refilled. If you are off of your medication for longer than 7 days, you will not be able to restart the medication for at least 6 months. Always call our office to report any side effects you may be having. Females, it is your responsibility to obtain negative pregnancy tests each month.

## 2017-11-17 ENCOUNTER — OFFICE VISIT (OUTPATIENT)
Dept: BARIATRICS/WEIGHT MGMT | Age: 57
End: 2017-11-17

## 2017-11-17 VITALS
HEIGHT: 63 IN | SYSTOLIC BLOOD PRESSURE: 110 MMHG | WEIGHT: 238 LBS | RESPIRATION RATE: 16 BRPM | HEART RATE: 60 BPM | DIASTOLIC BLOOD PRESSURE: 60 MMHG | BODY MASS INDEX: 42.17 KG/M2

## 2017-11-17 DIAGNOSIS — I10 ESSENTIAL HYPERTENSION: Chronic | ICD-10-CM

## 2017-11-17 DIAGNOSIS — E66.01 MORBID OBESITY WITH BMI OF 40.0-44.9, ADULT (HCC): Primary | ICD-10-CM

## 2017-11-17 PROCEDURE — 99213 OFFICE O/P EST LOW 20 MIN: CPT | Performed by: NURSE PRACTITIONER

## 2017-11-17 ASSESSMENT — ENCOUNTER SYMPTOMS
GASTROINTESTINAL NEGATIVE: 1
EYES NEGATIVE: 1
RESPIRATORY NEGATIVE: 1

## 2017-11-17 NOTE — PATIENT INSTRUCTIONS
Key Low Carb, High Healthy Fat Dietary Points:    - Meats (preferably organic or grass fed) are great sources of protein and are low in carbohydrates. Christian Chris with coconut, olive, avocado, or almond oils. - When buying dairy, choose regular or full fat options. - Choose vegetables that grow above ground as they are generally lower in carbohydrates. - Avoid bread, rice, potatoes, pasta and all sources of simple sugars (desserts, soda, breakfast cereals). - Choose beverages that are calorie and sugar free, such as water or flavored landaverde. - When eating fruit, choose berries as a snack option a few times a week. Reminder regarding weight loss medications: You must be seen in office every 2-4 weeks to have your prescriptions refilled. If you are off of your medication for longer than 7 days, you will not be able to restart the medication for at least 6 months. Always call our office to report any side effects you may be having. Females, it is your responsibility to obtain negative pregnancy tests each month.

## 2017-11-17 NOTE — PROGRESS NOTES
[facility]    Patient: Stan Monet                      Encounter Date: 11/17/2017    YOB: 1960               Age: 60 y.o. Chief Complaint   Patient presents with    Obesity     5th MWM, Contrave       /60   Pulse 60   Resp 16   Ht 5' 3\" (1.6 m)   Wt 238 lb (108 kg)   BMI 42.16 kg/m²     Body mass index is 42.16 kg/m². HPI: 60 y.o. female with a long-standing history of obesity presents today for follow-up. she has lost 1 pound since her last visit . Current treatment includes diet and exercise, along with Contrave (for past week). Met with the dietitian today. Plan reviewed in detail with the patient. She is following meal plan well. She has been on initial dose of Contrave for 1 week, denies any current side effects. Noted a decreased mood for 1 day, but resolved and has not returned. Patient reminded to contact us with any future side effects. Diet: []Low Carb, High Healthy Fat   [] 1500 calorie LCHF meal plan  [x] 1200 calorie LCHF meal plan    []Maintenance       []Other:         Adherent to dietary recommedations? [x]Yes     []No     Adherent to medication regimen? [x]Yes     []No       Medication causing appropriate appetite suppression? Yes [x]      No []    Side effects: Patient denies any chest pain, palpitations, suicidal ideation, constipation, dizziness, irritability, or insomnia. [x] BMI greater than 30, or greater than 27 with co-morbidities  [x] Denies changes to medical history or family history  [x] Denies changes to current medications  [x]Patient denies being currently pregnant or breastfeeding. Patient understands it is her responsibility to obtain negative pregnancy tests on a monthly basis. HTN- BP well controlled.  Continue home monitoring    Exercise: []Cardio     [x]Resistance/strength training, pilates - twice a week    []None    Allergies   Allergen Reactions    Vicodin [Hydrocodone-Acetaminophen] Nausea And Vomiting Current Outpatient Prescriptions:     naltrexone-bupropion (CONTRAVE) 8-90 MG per extended release tablet, Take 1 tablet by mouth daily for one week. Then increase to 1 tablet in the morning, and 1 tablet in the evening for the second week. Follow up on day 14., Disp: 120 tablet, Rfl: 0    rivaroxaban (XARELTO) 20 MG TABS tablet, TAKE 1 TABLET BY MOUTH DAILY, Disp: 90 tablet, Rfl: 3    Cholecalciferol (VITAMIN D) 2000 units CAPS capsule, Take 1 capsule by mouth daily, Disp: , Rfl:     pantoprazole (PROTONIX) 40 MG tablet, Take 1 tablet by mouth daily, Disp: 90 tablet, Rfl: 3    Probiotic Product (PROBIOTIC PO), Take by mouth, Disp: , Rfl:     metoprolol tartrate (LOPRESSOR) 50 MG tablet, TAKE 1 TABLET BY MOUTH 2 TIMES DAILY. , Disp: 180 tablet, Rfl: 3    ALPRAZolam (XANAX) 0.25 MG tablet, Take 0.25 mg by mouth daily as needed , Disp: , Rfl:     simvastatin (ZOCOR) 40 MG tablet, Take 40 mg by mouth nightly.  , Disp: , Rfl:     Patient Active Problem List   Diagnosis    Essential hypertension    SUSIE (obstructive sleep apnea)    Paroxysmal atrial fibrillation (HCC)    Hyperlipidemia    Atrial flutter (HCC)    Obesity due to excess calories    Vitamin D deficiency    Prediabetes    Morbid obesity with BMI of 40.0-44.9, adult ([facility] [address]       Review of Systems   Constitutional: Negative. HENT: Negative. Eyes: Negative. Respiratory: Negative. Cardiovascular: Negative. Gastrointestinal: Negative. Skin: Negative. Neurological: Negative. Physical Exam   Constitutional: She is oriented to person, place, and time. She appears well-developed and well-nourished. HENT:   Head: Normocephalic and atraumatic. Eyes: Pupils are equal, round, and reactive to light. Neck: Normal range of motion. Cardiovascular: Normal rate. Pulmonary/Chest: Effort normal.   Musculoskeletal: Normal range of motion. Neurological: She is alert and oriented to person, place, and time. Psychiatric: She has a normal mood and affect. Her behavior is normal. Judgment and thought content normal.         Assessment and Plan:    Ashli Villalobos is using Contrave with no side effects. Patient is feeling appetite suppression from the medication already. Patient is following the dietary guidelines well. She was advised on how to proceed with dosing for the next 3 weeks to get to maintenance dosing. She was also advised not to administer with a high fat meal. She will monitor her carb intake while following the meal plan. We will see her back in 3 weeks for follow up. Patient aware of potential side effects of medications. The patient understands it is her responsibility to follow up with the provider every 4 weeks while on this treatment program and failure to do so will result in being taken off the above treatment. The patient also understands that if they are off of the medication for 7 days, the medication will be discontinued and cannot be restarted for six months. The patient understands they are not to drink alcohol while on this medication, and that she should also abstain from using her Xanax. The patient understands that a 5% weight loss goal has been set for this 12 weeks of treatment and failure to meet this goal will result in changing the medication dosage or being taken off of the medication. Patient denies any history of cardiovascular disease (e.g., CAD, stroke, arrhythmias, CHF, uncontrolled HTN), MAOI use within the last 2 weeks, hyperthyroidism, glaucoma, agitated staes, history of drug abuse, pregnancy, nursing, known hypersensitivity to the sympathomimetic amines. The patient underwent dietary counseling with registered dietician. I have reviewed, discussed and agree with the dietary plan.     Treatment start date: 11/10/17  Starting Weight (for this 12 week goal): 239 pounds  Todays weight: Weight: 238 lb (108 kg)   Weight loss since last visit: 1 pound  Total weight loss:

## 2017-11-17 NOTE — PROGRESS NOTES
Stan Monet lost 1 lbs over past week. She tried a quest hero bar but after reviewing information online, bar had 30 g/CHO/serving. States she had some financial limitations with her diet but that she would have more money tomorrow. Current treatment plan:   Patient is on medication. Negative side effects from medications? Reports on her first day she was nauseated and was a little teary, but no problems since then  Patient is not on Optifast.   Patient is not on diet and exercise only plan. Treatment plan details: 1200 calorie low carb and contrave     Is patient adhering to diet/meal plan- mostly, limited at times d/t financial limitations     Carbohydrate consumption: utilizing meal plan     Breakfast: 2 HB eggs with butter and munoz powder, coffee with cream, and either carbmaster yogurt or fruit     Snack: no    Lunch: salad with 2 oz meat with EVOO wishbone dressing with yogurt or fruit and dill pickle     Snack: NV protein bar     Dinner: veggies, meat     Snack: slice of multigrain bread with PB     Fluids: water, water flavorings, coffee with cream, had only had 1 soda since first appointment     Consuming at least 64oz of calorie free fluids? Yes    Participating in intentional exercise?  Yes Details: Resistance training and pilates 2 x week since her last visit     Plan/Goals: Be consistent with exercise, continue with meal plan as directed     Handouts: none     Aram Dumont

## 2017-12-08 ENCOUNTER — TELEPHONE (OUTPATIENT)
Dept: CARDIOLOGY CLINIC | Age: 57
End: 2017-12-08

## 2017-12-12 ENCOUNTER — HOSPITAL ENCOUNTER (OUTPATIENT)
Dept: OTHER | Age: 57
Discharge: HOME | End: 2017-12-12
Attending: NURSE PRACTITIONER | Admitting: NURSE PRACTITIONER

## 2017-12-12 DIAGNOSIS — E66.01 MORBID OBESITY WITH BMI OF 40.0-44.9, ADULT (HCC): ICD-10-CM

## 2017-12-12 LAB
A/G RATIO: 1.6 (ref 1.1–2.2)
ALBUMIN SERPL-MCNC: 4.2 G/DL (ref 3.4–5)
ALP BLD-CCNC: 67 U/L (ref 40–129)
ALT SERPL-CCNC: 19 U/L (ref 10–40)
ANION GAP SERPL CALCULATED.3IONS-SCNC: 12 MMOL/L (ref 3–16)
AST SERPL-CCNC: 13 U/L (ref 15–37)
BASOPHILS ABSOLUTE: 0 K/UL (ref 0–0.2)
BASOPHILS RELATIVE PERCENT: 0.5 %
BILIRUB SERPL-MCNC: 0.4 MG/DL (ref 0–1)
BUN BLDV-MCNC: 21 MG/DL (ref 7–20)
CALCIUM SERPL-MCNC: 9.3 MG/DL (ref 8.3–10.6)
CHLORIDE BLD-SCNC: 100 MMOL/L (ref 99–110)
CHOLESTEROL, TOTAL: 135 MG/DL (ref 0–199)
CO2: 27 MMOL/L (ref 21–32)
CREAT SERPL-MCNC: 0.9 MG/DL (ref 0.6–1.1)
EOSINOPHILS ABSOLUTE: 0.1 K/UL (ref 0–0.6)
EOSINOPHILS RELATIVE PERCENT: 2.6 %
FOLATE: 14.46 NG/ML (ref 4.78–24.2)
GFR AFRICAN AMERICAN: >60
GFR NON-AFRICAN AMERICAN: >60
GLOBULIN: 2.6 G/DL
GLUCOSE BLD-MCNC: 103 MG/DL (ref 70–99)
HCT VFR BLD CALC: 41.3 % (ref 36–48)
HDLC SERPL-MCNC: 52 MG/DL (ref 40–60)
HEMOGLOBIN: 13.8 G/DL (ref 12–16)
LDL CHOLESTEROL CALCULATED: 67 MG/DL
LYMPHOCYTES ABSOLUTE: 1.3 K/UL (ref 1–5.1)
LYMPHOCYTES RELATIVE PERCENT: 27.1 %
MCH RBC QN AUTO: 28.8 PG (ref 26–34)
MCHC RBC AUTO-ENTMCNC: 33.4 G/DL (ref 31–36)
MCV RBC AUTO: 86.1 FL (ref 80–100)
MONOCYTES ABSOLUTE: 0.3 K/UL (ref 0–1.3)
MONOCYTES RELATIVE PERCENT: 7.3 %
NEUTROPHILS ABSOLUTE: 2.9 K/UL (ref 1.7–7.7)
NEUTROPHILS RELATIVE PERCENT: 62.5 %
PDW BLD-RTO: 15.7 % (ref 12.4–15.4)
PLATELET # BLD: 158 K/UL (ref 135–450)
PMV BLD AUTO: 9.3 FL (ref 5–10.5)
POTASSIUM SERPL-SCNC: 4.3 MMOL/L (ref 3.5–5.1)
RBC # BLD: 4.79 M/UL (ref 4–5.2)
SODIUM BLD-SCNC: 139 MMOL/L (ref 136–145)
TOTAL PROTEIN: 6.8 G/DL (ref 6.4–8.2)
TRIGL SERPL-MCNC: 80 MG/DL (ref 0–150)
TSH REFLEX: 2.97 UIU/ML (ref 0.27–4.2)
VITAMIN B-12: 427 PG/ML (ref 211–911)
VITAMIN D 25-HYDROXY: 23.5 NG/ML
VLDLC SERPL CALC-MCNC: 16 MG/DL
WBC # BLD: 4.7 K/UL (ref 4–11)

## 2017-12-14 ENCOUNTER — OFFICE VISIT (OUTPATIENT)
Dept: BARIATRICS/WEIGHT MGMT | Age: 57
End: 2017-12-14

## 2017-12-14 VITALS
HEIGHT: 63 IN | WEIGHT: 238 LBS | BODY MASS INDEX: 42.17 KG/M2 | SYSTOLIC BLOOD PRESSURE: 105 MMHG | HEART RATE: 66 BPM | DIASTOLIC BLOOD PRESSURE: 64 MMHG

## 2017-12-14 DIAGNOSIS — E66.01 MORBID OBESITY WITH BMI OF 40.0-44.9, ADULT (HCC): Primary | ICD-10-CM

## 2017-12-14 PROCEDURE — 99213 OFFICE O/P EST LOW 20 MIN: CPT | Performed by: NURSE PRACTITIONER

## 2017-12-14 ASSESSMENT — ENCOUNTER SYMPTOMS
RESPIRATORY NEGATIVE: 1
GASTROINTESTINAL NEGATIVE: 1
EYES NEGATIVE: 1

## 2017-12-14 NOTE — PROGRESS NOTES
[facility]    Patient: Lissa Benjamin                      Encounter Date: 12/14/2017    YOB: 1960               Age: 59 y.o. Chief Complaint   Patient presents with    Obesity     6th MWM       /64   Pulse 66   Ht 5' 3\" (1.6 m)   Wt 238 lb (108 kg)   BMI 42.16 kg/m²     Body mass index is 42.16 kg/m². HPI: 59 y.o. female with a long-standing history of obesity presents today for follow-up. she has been weight stable since since her last visit. She is very disappointed regarding her lack of weight loss. Current treatment includes 1200 calorie LCHF meal plan and exercise, along with Contrave. She is tolerating fairly well. She denies headaches, sleep disturbances, dizziness, abdominal pain or tremors. Met with the dietitian today. Plan reviewed in detail with the patient. She had issues with nausea, mostly when the dosages changed. She states this has since resolved, and is no longer having issues with nausea in the past 3 days. She had noted intermittent constipation, but has resolved with probiotics. She has been on the maintenance dose for about a week and has been noticing the appetite suppression over the past 3 days. She has noted an increase in sadness but feels this has been due to her being off of the Zoloft (which she discontinued in order to begin Contrave), rather than the Contrave itself. States she has felt these same feelings since being off of Zoloft. She denies any suicidal ideation. Diet: []Low Carb, High Healthy Fat   [] 1500 calorie LCHF meal plan  [x] 1200 calorie LCHF meal plan    []Maintenance       []Other:         Adherent to dietary recommedations? []Yes     [x]No- was not following closely while feeling nauseated     Adherent to medication regimen? [x]Yes     []No       Medication causing appropriate appetite suppression?   Yes [x]- in the past 3 days      No []    Side effects: Patient denies any chest pain, suicidal ideation, up. Refill provided of maintenance dose. Patient aware of potential side effects of medications, she understands responsibility to ensure monthly negative pregnancy tests. The patient understands it is her responsibility to follow up with the provider every 4 weeks while on this treatment program and failure to do so will result in being taken off the above treatment. The patient also understands that if they are off of the medication for 7 days, the medication will be discontinued and cannot be restarted for six months. The patient understands they are not to drink alcohol while on this medication, and that she should also abstain from using her Xanax. The patient understands that a 5% weight loss goal has been set for this 12 weeks of treatment and failure to meet this goal will result in changing the medication dosage or being taken off of the medication. Patient denies any history of  MAOI use within the last 2 weeks, hyperthyroidism, glaucoma, agitated staes, history of drug abuse, pregnancy, nursing, known hypersensitivity to the sympathomimetic amines. The patient underwent dietary counseling with registered dietician. I have reviewed, discussed and agree with the dietary plan. Treatment start date: 11/10/17  Starting Weight (for this 12 week goal): 239 pounds  Todays weight: Weight: 238 lb (108 kg)   Weight loss since last visit: 0 pounds  Total weight loss (toward this goal): 1 pound  12 week Goal: (At least 5%) 11.9 pounds by 2/10/18      Labs discussed and vitamin deficiencies addressed and/or prescriptions provided. I have spent 15 minutes counseling the patient and addressing the patients questions and concerns as well reviewing labs and/or other studies. More than 50% was face to face time counseling the patient.      Patient will follow up in 2 weeks.

## 2017-12-14 NOTE — PATIENT INSTRUCTIONS
Plan/Goals:   - Eat small, frequent meals and snacks including protein   - Continue current exercise routine  - Start tracking again      Key Low Carb, High Healthy Fat Dietary Points:    - Meats (preferably organic or grass fed) are great sources of protein and are low in carbohydrates. [place] [employer] with coconut, olive, avocado, or almond oils. - When buying dairy, choose regular or full fat options. - Choose vegetables that grow above ground as they are generally lower in carbohydrates. - Avoid bread, rice, potatoes, pasta and all sources of simple sugars (desserts, soda, breakfast cereals). - Choose beverages that are calorie and sugar free, such as water or flavored [place]. - When eating fruit, choose berries as a snack option a few times a week. Reminder regarding weight loss medications: You must be seen in office every 2-4 weeks to have your prescriptions refilled. If you are off of your medication for longer than 7 days, you will not be able to restart the medication for at least 6 months. Always call our office to report any side effects you may be having. Females, it is your responsibility to obtain negative pregnancy tests each month.

## 2017-12-21 ENCOUNTER — TELEPHONE (OUTPATIENT)
Dept: BARIATRICS/WEIGHT MGMT | Age: 57
End: 2017-12-21

## 2017-12-21 NOTE — TELEPHONE ENCOUNTER
Spoke with patient, she is having nausea and flu-like symptoms. She feels this is a side effect of the medication. She has also noted a decrease in energy. I advised her to stop the medication. I did discuss with Dr. Yanely Collins, no need for patient to wean off since she has been on the maintenance dose for only a little over a month. She will follow up with her PCP for any mood alterations she experiences after stopping the Contrave. She has a follow up appointment next week, we will discuss other treatment options at that time. Patient verbalized understanding, denied further questions.

## 2017-12-21 NOTE — TELEPHONE ENCOUNTER
Kev Mora called she is have problems with the Contrave. She says it is making her too sick. You can call her back at [phone].

## 2018-02-07 RX ORDER — METOPROLOL TARTRATE 50 MG/1
TABLET, FILM COATED ORAL
Qty: 60 TABLET | Refills: 3 | Status: SHIPPED | OUTPATIENT
Start: 2018-02-07 | End: 2020-10-02

## 2018-04-09 ENCOUNTER — OFFICE VISIT (OUTPATIENT)
Dept: CARDIOLOGY CLINIC | Age: 58
End: 2018-04-09

## 2018-04-09 VITALS
BODY MASS INDEX: 41.96 KG/M2 | HEART RATE: 150 BPM | HEIGHT: 63 IN | OXYGEN SATURATION: 99 % | SYSTOLIC BLOOD PRESSURE: 120 MMHG | WEIGHT: 236.8 LBS | DIASTOLIC BLOOD PRESSURE: 86 MMHG

## 2018-04-09 DIAGNOSIS — I48.0 PAROXYSMAL ATRIAL FIBRILLATION (HCC): Primary | Chronic | ICD-10-CM

## 2018-04-09 DIAGNOSIS — I10 ESSENTIAL HYPERTENSION: Chronic | ICD-10-CM

## 2018-04-09 DIAGNOSIS — G47.33 OSA (OBSTRUCTIVE SLEEP APNEA): ICD-10-CM

## 2018-04-09 PROCEDURE — 93000 ELECTROCARDIOGRAM COMPLETE: CPT | Performed by: INTERNAL MEDICINE

## 2018-04-09 PROCEDURE — 99214 OFFICE O/P EST MOD 30 MIN: CPT | Performed by: INTERNAL MEDICINE

## 2018-04-09 RX ORDER — PROPAFENONE HYDROCHLORIDE 225 MG/1
225 CAPSULE, EXTENDED RELEASE ORAL 2 TIMES DAILY
Qty: 60 CAPSULE | Refills: 3 | Status: SHIPPED | OUTPATIENT
Start: 2018-04-09 | End: 2018-05-01 | Stop reason: ALTCHOICE

## 2018-04-10 ENCOUNTER — TELEPHONE (OUTPATIENT)
Dept: CARDIOLOGY CLINIC | Age: 58
End: 2018-04-10

## 2018-04-16 ENCOUNTER — TELEPHONE (OUTPATIENT)
Dept: CARDIOLOGY CLINIC | Age: 58
End: 2018-04-16

## 2018-04-17 ENCOUNTER — OFFICE VISIT (OUTPATIENT)
Dept: CARDIOLOGY CLINIC | Age: 58
End: 2018-04-17

## 2018-04-17 VITALS
SYSTOLIC BLOOD PRESSURE: 120 MMHG | OXYGEN SATURATION: 97 % | BODY MASS INDEX: 42.05 KG/M2 | HEIGHT: 63 IN | WEIGHT: 237.3 LBS | DIASTOLIC BLOOD PRESSURE: 72 MMHG | HEART RATE: 59 BPM

## 2018-04-17 DIAGNOSIS — I10 ESSENTIAL HYPERTENSION: ICD-10-CM

## 2018-04-17 DIAGNOSIS — I48.0 PAROXYSMAL ATRIAL FIBRILLATION (HCC): Primary | Chronic | ICD-10-CM

## 2018-04-17 DIAGNOSIS — R06.02 SOB (SHORTNESS OF BREATH): ICD-10-CM

## 2018-04-17 PROCEDURE — 99214 OFFICE O/P EST MOD 30 MIN: CPT | Performed by: NURSE PRACTITIONER

## 2018-04-17 PROCEDURE — 93000 ELECTROCARDIOGRAM COMPLETE: CPT | Performed by: NURSE PRACTITIONER

## 2018-05-01 ENCOUNTER — TELEPHONE (OUTPATIENT)
Dept: CARDIOLOGY CLINIC | Age: 58
End: 2018-05-01

## 2018-05-01 ENCOUNTER — OFFICE VISIT (OUTPATIENT)
Dept: CARDIOLOGY CLINIC | Age: 58
End: 2018-05-01

## 2018-05-01 VITALS
HEART RATE: 68 BPM | BODY MASS INDEX: 41.83 KG/M2 | HEIGHT: 63 IN | SYSTOLIC BLOOD PRESSURE: 128 MMHG | WEIGHT: 236.1 LBS | DIASTOLIC BLOOD PRESSURE: 74 MMHG

## 2018-05-01 DIAGNOSIS — I48.0 PAROXYSMAL ATRIAL FIBRILLATION (HCC): Primary | Chronic | ICD-10-CM

## 2018-05-01 DIAGNOSIS — G47.33 OSA (OBSTRUCTIVE SLEEP APNEA): ICD-10-CM

## 2018-05-01 DIAGNOSIS — R06.02 SHORTNESS OF BREATH: ICD-10-CM

## 2018-05-01 DIAGNOSIS — E78.2 MIXED HYPERLIPIDEMIA: Chronic | ICD-10-CM

## 2018-05-01 PROCEDURE — 93000 ELECTROCARDIOGRAM COMPLETE: CPT | Performed by: INTERNAL MEDICINE

## 2018-05-01 PROCEDURE — 99214 OFFICE O/P EST MOD 30 MIN: CPT | Performed by: INTERNAL MEDICINE

## 2018-05-01 RX ORDER — SIMVASTATIN 20 MG
20 TABLET ORAL NIGHTLY
Qty: 90 TABLET | Refills: 0
Start: 2018-05-01 | End: 2021-05-11 | Stop reason: DRUGHIGH

## 2018-05-01 RX ORDER — AMIODARONE HYDROCHLORIDE 200 MG/1
200 TABLET ORAL 2 TIMES DAILY
Qty: 60 TABLET | Refills: 5 | Status: SHIPPED | OUTPATIENT
Start: 2018-05-01 | End: 2019-01-23 | Stop reason: SDUPTHER

## 2018-05-08 ENCOUNTER — NURSE ONLY (OUTPATIENT)
Dept: CARDIOLOGY CLINIC | Age: 58
End: 2018-05-08

## 2018-05-08 ENCOUNTER — OFFICE VISIT (OUTPATIENT)
Dept: CARDIOLOGY CLINIC | Age: 58
End: 2018-05-08

## 2018-05-08 VITALS
WEIGHT: 237.8 LBS | BODY MASS INDEX: 42.13 KG/M2 | SYSTOLIC BLOOD PRESSURE: 130 MMHG | DIASTOLIC BLOOD PRESSURE: 80 MMHG | HEART RATE: 58 BPM | HEIGHT: 63 IN

## 2018-05-08 DIAGNOSIS — I48.3 TYPICAL ATRIAL FLUTTER (HCC): Chronic | ICD-10-CM

## 2018-05-08 DIAGNOSIS — G47.33 OSA (OBSTRUCTIVE SLEEP APNEA): ICD-10-CM

## 2018-05-08 DIAGNOSIS — I10 ESSENTIAL HYPERTENSION: Chronic | ICD-10-CM

## 2018-05-08 DIAGNOSIS — I48.0 PAROXYSMAL ATRIAL FIBRILLATION (HCC): Primary | Chronic | ICD-10-CM

## 2018-05-08 DIAGNOSIS — I48.0 PAROXYSMAL ATRIAL FIBRILLATION (HCC): Chronic | ICD-10-CM

## 2018-05-08 PROCEDURE — 99214 OFFICE O/P EST MOD 30 MIN: CPT | Performed by: INTERNAL MEDICINE

## 2018-05-08 PROCEDURE — 93000 ELECTROCARDIOGRAM COMPLETE: CPT | Performed by: INTERNAL MEDICINE

## 2018-06-08 PROCEDURE — 93272 ECG/REVIEW INTERPRET ONLY: CPT | Performed by: INTERNAL MEDICINE

## 2018-06-14 ENCOUNTER — TELEPHONE (OUTPATIENT)
Dept: CARDIOLOGY CLINIC | Age: 58
End: 2018-06-14

## 2018-06-27 RX ORDER — METOPROLOL TARTRATE 50 MG/1
TABLET, FILM COATED ORAL
Qty: 180 TABLET | Refills: 2 | Status: SHIPPED | OUTPATIENT
Start: 2018-06-27 | End: 2018-11-06

## 2018-07-16 ENCOUNTER — OFFICE VISIT (OUTPATIENT)
Dept: CARDIOLOGY CLINIC | Age: 58
End: 2018-07-16

## 2018-07-16 VITALS
WEIGHT: 244.4 LBS | HEIGHT: 63 IN | DIASTOLIC BLOOD PRESSURE: 88 MMHG | SYSTOLIC BLOOD PRESSURE: 129 MMHG | BODY MASS INDEX: 43.3 KG/M2 | HEART RATE: 66 BPM

## 2018-07-16 DIAGNOSIS — G47.33 OSA (OBSTRUCTIVE SLEEP APNEA): ICD-10-CM

## 2018-07-16 DIAGNOSIS — I10 ESSENTIAL HYPERTENSION: Chronic | ICD-10-CM

## 2018-07-16 DIAGNOSIS — E66.09 OBESITY DUE TO EXCESS CALORIES, UNSPECIFIED CLASSIFICATION, UNSPECIFIED WHETHER SERIOUS COMORBIDITY PRESENT: ICD-10-CM

## 2018-07-16 DIAGNOSIS — I48.0 PAROXYSMAL ATRIAL FIBRILLATION (HCC): Primary | Chronic | ICD-10-CM

## 2018-07-16 DIAGNOSIS — I48.92 ATRIAL FLUTTER, UNSPECIFIED TYPE (HCC): Chronic | ICD-10-CM

## 2018-07-16 PROCEDURE — 99214 OFFICE O/P EST MOD 30 MIN: CPT | Performed by: INTERNAL MEDICINE

## 2018-07-16 PROCEDURE — 93000 ELECTROCARDIOGRAM COMPLETE: CPT | Performed by: INTERNAL MEDICINE

## 2018-07-16 NOTE — PROGRESS NOTES
Skin: Skin is warm and dry. No rash noted. Psychiatric: Has a normal behavior        Labs:  TSH 2.97 (12/12/17)  LDL 67 (12/12/17)  Cr 0.9 (12/12/17)  Reviewed. ECG:  SR 63bpm  Echo: 5/1/2018  LVEF 60-65%, LV wall motion is normal, LA is moderately dilated, the MV leaflets are moderately thickened in appearance. The AV is not well visualized, the TV is not well visualized.      MCOT 5/9-6/7/2018  Shows some PAT/ PAF    Medication:  Current Outpatient Prescriptions   Medication Sig Dispense Refill    metoprolol tartrate (LOPRESSOR) 50 MG tablet TAKE 1 TABLET BY MOUTH 2 TIMES DAILY. 180 tablet 2    amiodarone (CORDARONE) 200 MG tablet Take 1 tablet by mouth 2 times daily 60 tablet 5    simvastatin (ZOCOR) 20 MG tablet Take 1 tablet by mouth nightly 90 tablet 0    metoprolol tartrate (LOPRESSOR) 50 MG tablet TAKE 1 TABLET BY MOUTH TWICE A DAY 60 tablet 3    rivaroxaban (XARELTO) 20 MG TABS tablet TAKE 1 TABLET BY MOUTH DAILY 90 tablet 3    Cholecalciferol (VITAMIN D) 2000 units CAPS capsule Take 1 capsule by mouth daily      pantoprazole (PROTONIX) 40 MG tablet Take 1 tablet by mouth daily 90 tablet 3    Probiotic Product (PROBIOTIC PO) Take by mouth      ALPRAZolam (XANAX) 0.25 MG tablet Take 0.25 mg by mouth daily as needed       naltrexone-bupropion (CONTRAVE) 8-90 MG per extended release tablet Take 2 tablets by mouth 2 times daily 120 tablet 0     No current facility-administered medications for this visit.         Patient Active Problem List    Diagnosis Date Noted    Morbid obesity with BMI of 40.0-44.9, [facility] 11/09/2017     Priority: Low    Vitamin D deficiency 07/27/2017     Priority: Low    Prediabetes 07/27/2017     Priority: Low    Atrial flutter (HCC)      Priority: Low    Obesity due to excess calories      Priority: Low    Essential hypertension 05/13/2011     Priority: Low    SUSIE (obstructive sleep apnea) 05/13/2011     Priority: Low    Paroxysmal atrial fibrillation ([facility] 05/13/2011     Priority: Low    Hyperlipidemia 05/13/2011     Priority: Low      Echo: 12/17/16  Overall left ventricular ejection fraction is estimated to be 60-65%. Left ventricular systolic function is normal. (LVEF>/=55%)  The left ventricular wall motion is normal.  The left atrium is moderately dilated. The mitral valve leaflets are moderately thickened in appearance. The aortic valve is not well visualized. The tricuspid valve is not well visualized. Assessment and plan:     - Recurrent atrial fibrillation/flutter   ECG 7/16/2018 sinus rhythm 63bpm     However patient continues to have episodes of atrial fibrillation which are symptomatic. Event recorder revealed episodes of symptomatic paroxysmal atrial fibrillation. on amiodarone 200mg daily   MCOT 5/9-6/7/18 showed PAT, AF     I have discussed treatment with Amiodarone. Risks, benefits and alternative were explained. Dicussed side effects of amiodarone therapy. Patient would like to proceed with therapy. Discussed photosensitivity with amiodarone. Continue Metoprolol 50 bid. Discussed that during episodes of atrial fib with rapid heart rate she may take an extra dose of metoprolol. -12/15/16 - A fib ablation with PVI, ablation of CVTI atrial flutter     Recommend ablation of atrial fibrillation. Discussed in details. All questions were answered. She still not ready to proceed with redo ablation. She would like to lose weight first and see if that helps. Agree with continuing efforts to lose weight. Aggressive risk factor modifications particularly weight loss recommended. Check AST ALT and TSH. HTN:     /88 (Site: Left Arm, Position: Sitting, Cuff Size: Large Adult)   Pulse 66   Ht 5' 3\" (1.6 m)   Wt 244 lb 6.4 oz (110.9 kg)   BMI 43.29 kg/m²     Controlled. Metoprolol 50 mg bid    - SUSIE:    uses CPAP. - Morbid Obesity: Body mass index is 43.29 kg/m².     - Nathalie [address] [employer] and have lost weight. Follow up in 6 months     Thank you for allowing me to participate in the care of Des Martínez. Further evaluation will be based upon the patient's clinical course and testing results. I, Dr. Camelia Garcia personally performed the services described in this documentation as scribed by Claudia Reich RN in my presence, and it is both accurate and complete. All questions and concerns were addressed to the patient/family. Alternatives to my treatment were discussed. I have discussed the above stated plan and the patient verbalized understanding and agreed with the plan. NOTE: This report was transcribed using voice recognition software. Every effort was made to ensure accuracy, however, inadvertent computerized transcription errors may be present.       Camelia Garcia MD, MPH  [place] [address]   Office: [phone]

## 2018-07-16 NOTE — PATIENT INSTRUCTIONS
Patient Education          amiodarone (oral)  Pronunciation:  BHANU wolf ISABELLE hill  Brand:  Pacerone  What is the most important information I should know about amiodarone? Amiodarone is for use only in treating life-threatening heart rhythm disorders. You should not take this medicine if you are allergic to amiodarone or iodine, or if you have 2nd- or 3rd-degree \"AV block\" (unless you have a pacemaker), a history of slow heartbeats, or if your heart cannot pump blood properly. Amiodarone can cause dangerous side effects on your heart, liver, lungs, or thyroid. Call your doctor or get medical help at once if you have: chest pain, fast or pounding heartbeats, trouble breathing, upper stomach pain, vomiting, dark urine, jaundice (yellowing of the skin or eyes), or if you cough up blood. Tell your doctor if you have signs of a thyroid problem, such as weight changes, extreme tiredness, dry skin, thinning hair, feeling too hot or too cold, irregular menstrual periods, or swelling in your neck (goiter). What is amiodarone? Amiodarone affects the rhythm of your heartbeats. Amiodarone is used to help keep the heart beating normally in people with life-threatening heart rhythm disorders of the ventricles (the lower chambers of the heart that allow blood to flow out of the heart). Amiodarone is used to treat ventricular tachycardia or ventricular fibrillation. Amiodarone is for use only in treating life-threatening heart rhythm disorders. Amiodarone may also be used for purposes not listed in this medication guide. What should I discuss with my healthcare provider before taking amiodarone? You should not use this medicine if you are allergic to amiodarone or iodine, or if you have:  · a serious heart condition called \"AV block\" (2nd or 3rd degree), unless you have a pacemaker;  · a history of slow heartbeats that have caused you to faint; or  · if your heart cannot pump blood properly.   Amiodarone can cause dangerous side effects on your heart, liver, lungs, or thyroid. To make sure amiodarone is safe for you, tell your doctor if you have ever had:  · asthma or another lung disorder;  · liver disease;  · a thyroid disorder;  · vision problems;  · high or low blood pressure;  · an electrolyte imbalance (such as low levels of potassium or magnesium in your blood); or  · if you have a pacemaker or defibrillator implanted in your chest.  Do not use amiodarone if you are pregnant. Taking amiodarone during pregnancy can harm the unborn baby or cause thyroid problems or abnormal heartbeats after the baby is born. Amiodarone may also affect the child's growth or development (speech, movement, academic skills) later in life. Use effective birth control to prevent pregnancy during treatment. Amiodarone can pass into breast milk and may harm a nursing baby. You should not breast-feed while using this medicine. How should I take amiodarone? Follow all directions on your prescription label. Do not take this medicine in larger or smaller amounts or for longer than recommended. You will receive your first few doses in a hospital setting, where your heart rhythm can be monitored. If you have been taking another heart rhythm medicine, you may need to gradually stop taking it when you start using amiodarone. Follow your doctor's dosing instructions very carefully. You may take amiodarone with or without food, but take it the same way each time. It may take up to 2 weeks before your heart rhythm improves. Keep using the medicine as directed even if you feel well. While using amiodarone, you will need frequent medical tests and chest x-rays to check your thyroid, vision, lungs, and liver function. Amiodarone can have long lasting effects on your body. You may need medical tests for several months after you stop using this medicine.   If you need surgery (including laser eye surgery), tell the surgeon ahead of time that you are using amiodarone:  · wheezing, cough, chest pain, coughing up blood, breathing problems that get worse;  · a new or a worsening irregular heartbeat pattern (fast, slow, or pounding heartbeats);  · a light-headed feeling, like you might pass out;  · blurred vision, seeing halos around lights, vision loss, headache or pain behind your eyes, sometimes with vomiting;  · liver problems --nausea, upper stomach pain, itching, tired feeling, loss of appetite, dark urine, love-colored stools, jaundice (yellowing of the skin or eyes);  · nerve problems --loss of coordination, muscle weakness, uncontrolled muscle movement, or numbness and tingling in your hands or lower legs;  · signs of overactive thyroid --weight loss, thinning hair, feeling too hot or too cold, increased sweating, irregular menstrual periods, swelling in your neck (goiter); or  · signs of underactive thyroid --extreme tired feeling, dry skin, joint pain or stiffness, muscle pain or weakness, hoarse voice, feeling more sensitive to cold temperatures, weight gain. Common side effects may include:  · nausea, vomiting, loss of appetite; or  · constipation. This is not a complete list of side effects and others may occur. Call your doctor for medical advice about side effects. You may report side effects to FDA at 4-392-FDA-1216. What other drugs will affect amiodarone? Amiodarone takes a long time to completely clear from your body. Drug interactions are possible for up to several months after you stop using amiodarone. Talk to your doctor before taking any medication during this time. Keep track of how long it has been since your last dose of amiodarone. Many drugs can interact with amiodarone. Not all possible interactions are listed here.  Tell your doctor about all your current medicines and any you start or stop using, especially:  · an antibiotic or antifungal medicine;  · an antidepressant;  · anti-malaria medicine;  · antiviral medicine to treat any aspect of healthcare administered with the aid of information [facility] provides. The information contained herein is not intended to cover all possible uses, directions, precautions, warnings, drug interactions, allergic reactions, or adverse effects. If you have questions about the drugs you are taking, check with your doctor, nurse or pharmacist.  Copyright 0084-0294 [address] [place]. Version: 6.05. Revision date: 5/26/2017. Care instructions adapted under license by [facility] ([facility]). If you have questions about a medical condition or this instruction, always ask your healthcare professional. [place] [address] any warranty or liability for your use of this information.

## 2018-07-17 LAB
ALBUMIN SERPL-MCNC: 4.6 G/DL (ref 3.5–5)
ALP BLD-CCNC: 76 IU/L (ref 35–104)
ALT SERPL-CCNC: 29 IU/L (ref 10–35)
ANION GAP SERPL CALCULATED.3IONS-SCNC: 15 MMOL/L (ref 6–18)
AST SERPL-CCNC: 18 IU/L (ref 10–35)
BILIRUB SERPL-MCNC: 0.6 MG/DL (ref 0–1)
BILIRUBIN DIRECT: <0.2 MG/DL (ref 0–0.2)
BUN BLDV-MCNC: 15 MG/DL (ref 8–26)
CALCIUM SERPL-MCNC: 9.6 MG/DL (ref 8.4–10.2)
CHLORIDE BLD-SCNC: 102 MEQ/L (ref 101–111)
CO2: 27 MMOL/L (ref 22–29)
CREAT SERPL-MCNC: 1.09 MG/DL (ref 0.44–1.03)
GFR AFRICAN AMERICAN: 63 ML/MIN/1.73 M2
GFR NON-AFRICAN AMERICAN: 55 ML/MIN/1.73 M2
GLUCOSE BLD-MCNC: 76 MG/DL (ref 70–99)
POTASSIUM SERPL-SCNC: 4.1 MEQ/L (ref 3.6–5.1)
SODIUM BLD-SCNC: 140 MEQ/L (ref 135–145)
T4 FREE: 0.98 NG/DL (ref 0.93–1.7)
TOTAL PROTEIN: 7.1 G/DL (ref 6.6–8.7)
TSH ULTRASENSITIVE: 14.33 (ref 0.27–4.2)

## 2018-07-18 ENCOUNTER — TELEPHONE (OUTPATIENT)
Dept: CARDIOLOGY CLINIC | Age: 58
End: 2018-07-18

## 2018-09-23 DIAGNOSIS — E78.2 MIXED HYPERLIPIDEMIA: Chronic | ICD-10-CM

## 2018-09-25 RX ORDER — PANTOPRAZOLE SODIUM 40 MG/1
40 TABLET, DELAYED RELEASE ORAL DAILY
Qty: 90 TABLET | Refills: 1 | Status: ON HOLD | OUTPATIENT
Start: 2018-09-25 | End: 2019-06-20 | Stop reason: SDUPTHER

## 2018-10-15 RX ORDER — RIVAROXABAN 20 MG/1
TABLET, FILM COATED ORAL
Qty: 90 TABLET | Refills: 3 | Status: SHIPPED | OUTPATIENT
Start: 2018-10-15 | End: 2019-10-12 | Stop reason: SDUPTHER

## 2018-11-06 ENCOUNTER — OFFICE VISIT (OUTPATIENT)
Dept: BARIATRICS/WEIGHT MGMT | Age: 58
End: 2018-11-06

## 2018-11-06 VITALS
DIASTOLIC BLOOD PRESSURE: 84 MMHG | HEIGHT: 63 IN | WEIGHT: 251 LBS | SYSTOLIC BLOOD PRESSURE: 136 MMHG | BODY MASS INDEX: 44.47 KG/M2 | HEART RATE: 60 BPM

## 2018-11-06 DIAGNOSIS — E66.01 MORBID OBESITY WITH BMI OF 40.0-44.9, ADULT (HCC): Primary | ICD-10-CM

## 2018-11-06 DIAGNOSIS — Z71.3 DIETARY COUNSELING AND SURVEILLANCE: ICD-10-CM

## 2018-11-06 PROCEDURE — 99213 OFFICE O/P EST LOW 20 MIN: CPT | Performed by: FAMILY MEDICINE

## 2018-11-06 RX ORDER — LEVOTHYROXINE SODIUM 112 UG/1
112 TABLET ORAL DAILY
COMMUNITY
End: 2019-09-05

## 2018-11-06 ASSESSMENT — ENCOUNTER SYMPTOMS
RESPIRATORY NEGATIVE: 1
EYES NEGATIVE: 1
GASTROINTESTINAL NEGATIVE: 1

## 2018-11-10 ENCOUNTER — HOSPITAL ENCOUNTER (OUTPATIENT)
Age: 58
Discharge: HOME | End: 2018-11-10
Payer: COMMERCIAL

## 2018-11-10 DIAGNOSIS — E66.01 MORBID OBESITY WITH BMI OF 40.0-44.9, ADULT (HCC): ICD-10-CM

## 2018-11-10 LAB
A/G RATIO: 1.8 (ref 1.1–2.2)
ALBUMIN SERPL-MCNC: 4.4 G/DL (ref 3.4–5)
ALP BLD-CCNC: 74 U/L (ref 40–129)
ALT SERPL-CCNC: 50 U/L (ref 10–40)
ANION GAP SERPL CALCULATED.3IONS-SCNC: 14 MMOL/L (ref 3–16)
AST SERPL-CCNC: 28 U/L (ref 15–37)
BASOPHILS ABSOLUTE: 0 K/UL (ref 0–0.2)
BASOPHILS RELATIVE PERCENT: 1 %
BILIRUB SERPL-MCNC: 0.3 MG/DL (ref 0–1)
BUN BLDV-MCNC: 26 MG/DL (ref 7–20)
CALCIUM SERPL-MCNC: 9.3 MG/DL (ref 8.3–10.6)
CHLORIDE BLD-SCNC: 105 MMOL/L (ref 99–110)
CHOLESTEROL, TOTAL: 150 MG/DL (ref 0–199)
CO2: 23 MMOL/L (ref 21–32)
CREAT SERPL-MCNC: 0.9 MG/DL (ref 0.6–1.1)
EOSINOPHILS ABSOLUTE: 0.1 K/UL (ref 0–0.6)
EOSINOPHILS RELATIVE PERCENT: 2.7 %
FOLATE: 14.69 NG/ML (ref 4.78–24.2)
GFR AFRICAN AMERICAN: >60
GFR NON-AFRICAN AMERICAN: >60
GLOBULIN: 2.5 G/DL
GLUCOSE BLD-MCNC: 114 MG/DL (ref 70–99)
HCT VFR BLD CALC: 43.3 % (ref 36–48)
HDLC SERPL-MCNC: 47 MG/DL (ref 40–60)
HEMOGLOBIN: 14.3 G/DL (ref 12–16)
LDL CHOLESTEROL CALCULATED: 88 MG/DL
LYMPHOCYTES ABSOLUTE: 1.2 K/UL (ref 1–5.1)
LYMPHOCYTES RELATIVE PERCENT: 28.6 %
MCH RBC QN AUTO: 29.7 PG (ref 26–34)
MCHC RBC AUTO-ENTMCNC: 33.1 G/DL (ref 31–36)
MCV RBC AUTO: 89.7 FL (ref 80–100)
MONOCYTES ABSOLUTE: 0.4 K/UL (ref 0–1.3)
MONOCYTES RELATIVE PERCENT: 9.2 %
NEUTROPHILS ABSOLUTE: 2.4 K/UL (ref 1.7–7.7)
NEUTROPHILS RELATIVE PERCENT: 58.5 %
PDW BLD-RTO: 15.4 % (ref 12.4–15.4)
PLATELET # BLD: 176 K/UL (ref 135–450)
PMV BLD AUTO: 9.8 FL (ref 5–10.5)
POTASSIUM SERPL-SCNC: 4.5 MMOL/L (ref 3.5–5.1)
RBC # BLD: 4.83 M/UL (ref 4–5.2)
SODIUM BLD-SCNC: 142 MMOL/L (ref 136–145)
TOTAL PROTEIN: 6.9 G/DL (ref 6.4–8.2)
TRIGL SERPL-MCNC: 74 MG/DL (ref 0–150)
VITAMIN B-12: 477 PG/ML (ref 211–911)
VITAMIN D 25-HYDROXY: 28.7 NG/ML
VLDLC SERPL CALC-MCNC: 15 MG/DL
WBC # BLD: 4.1 K/UL (ref 4–11)

## 2018-11-10 PROCEDURE — 80061 LIPID PANEL: CPT

## 2018-11-10 PROCEDURE — 83036 HEMOGLOBIN GLYCOSYLATED A1C: CPT

## 2018-11-10 PROCEDURE — 82306 VITAMIN D 25 HYDROXY: CPT

## 2018-11-10 PROCEDURE — 80053 COMPREHEN METABOLIC PANEL: CPT

## 2018-11-10 PROCEDURE — 85025 COMPLETE CBC W/AUTO DIFF WBC: CPT

## 2018-11-10 PROCEDURE — 82607 VITAMIN B-12: CPT

## 2018-11-10 PROCEDURE — 82746 ASSAY OF FOLIC ACID SERUM: CPT

## 2018-11-10 PROCEDURE — 36415 COLL VENOUS BLD VENIPUNCTURE: CPT

## 2018-11-11 LAB
ESTIMATED AVERAGE GLUCOSE: 139.9 MG/DL
HBA1C MFR BLD: 6.5 %

## 2018-11-13 ENCOUNTER — TELEPHONE (OUTPATIENT)
Dept: BARIATRICS/WEIGHT MGMT | Age: 58
End: 2018-11-13

## 2019-01-22 ENCOUNTER — OFFICE VISIT (OUTPATIENT)
Dept: CARDIOLOGY CLINIC | Age: 59
End: 2019-01-22
Payer: COMMERCIAL

## 2019-01-22 VITALS
HEIGHT: 63 IN | WEIGHT: 241.4 LBS | BODY MASS INDEX: 42.77 KG/M2 | SYSTOLIC BLOOD PRESSURE: 132 MMHG | DIASTOLIC BLOOD PRESSURE: 88 MMHG | HEART RATE: 65 BPM

## 2019-01-22 DIAGNOSIS — I48.92 ATRIAL FLUTTER, UNSPECIFIED TYPE (HCC): Chronic | ICD-10-CM

## 2019-01-22 DIAGNOSIS — I48.0 PAROXYSMAL ATRIAL FIBRILLATION (HCC): Chronic | ICD-10-CM

## 2019-01-22 PROCEDURE — 93000 ELECTROCARDIOGRAM COMPLETE: CPT | Performed by: INTERNAL MEDICINE

## 2019-01-22 PROCEDURE — 99214 OFFICE O/P EST MOD 30 MIN: CPT | Performed by: INTERNAL MEDICINE

## 2019-01-24 RX ORDER — AMIODARONE HYDROCHLORIDE 200 MG/1
TABLET ORAL
Qty: 60 TABLET | Refills: 11 | Status: ON HOLD | OUTPATIENT
Start: 2019-01-24 | End: 2019-06-20 | Stop reason: HOSPADM

## 2019-02-19 ENCOUNTER — TELEPHONE (OUTPATIENT)
Dept: CARDIOLOGY CLINIC | Age: 59
End: 2019-02-19

## 2019-03-08 ENCOUNTER — TELEPHONE (OUTPATIENT)
Dept: CARDIOLOGY CLINIC | Age: 59
End: 2019-03-08

## 2019-03-26 ENCOUNTER — TELEPHONE (OUTPATIENT)
Dept: CARDIOLOGY CLINIC | Age: 59
End: 2019-03-26

## 2019-05-03 RX ORDER — METOPROLOL TARTRATE 50 MG/1
TABLET, FILM COATED ORAL
Qty: 180 TABLET | Refills: 2 | Status: ON HOLD | OUTPATIENT
Start: 2019-05-03 | End: 2019-06-20 | Stop reason: HOSPADM

## 2019-05-15 ENCOUNTER — TELEPHONE (OUTPATIENT)
Dept: CARDIOLOGY CLINIC | Age: 59
End: 2019-05-15

## 2019-05-15 NOTE — TELEPHONE ENCOUNTER
Just saw RMM 1/2019.   Not required to see PCP prior to ablation unless something has changed and she want to

## 2019-05-15 NOTE — TELEPHONE ENCOUNTER
Pt asking if she needs to see PCP prior to her ablation in June for a pre op physical. Please call to advise

## 2019-06-10 ENCOUNTER — TELEPHONE (OUTPATIENT)
Dept: CARDIOLOGY CLINIC | Age: 59
End: 2019-06-10

## 2019-06-10 NOTE — TELEPHONE ENCOUNTER
Spoke with Becki regarding symptoms. She thinks her heart rhythm is irregular, with a rate of  70 to 80. She is taking Metoprolol 50mg bid. Told her to continue that. She is scheduled for an ablation in 1 week. Instructed to come to the ED if her heart rate gets rapid and she is symptomatic.

## 2019-06-10 NOTE — TELEPHONE ENCOUNTER
She isnt feeling good today,  feels like her HR is irregular, fatigue, SOB , and has headache . She is scheduled for ablation 6/19/19. What should she do between now and 6/19/19?

## 2019-06-19 ENCOUNTER — HOSPITAL ENCOUNTER (OUTPATIENT)
Dept: CARDIAC CATH/INVASIVE PROCEDURES | Age: 59
LOS: 1 days | Discharge: HOME | End: 2019-06-20
Attending: INTERNAL MEDICINE | Admitting: INTERNAL MEDICINE
Payer: COMMERCIAL

## 2019-06-19 ENCOUNTER — ANESTHESIA (OUTPATIENT)
Dept: CARDIAC CATH/INVASIVE PROCEDURES | Age: 59
End: 2019-06-19
Payer: COMMERCIAL

## 2019-06-19 ENCOUNTER — ANESTHESIA EVENT (OUTPATIENT)
Dept: CARDIAC CATH/INVASIVE PROCEDURES | Age: 59
End: 2019-06-19
Payer: COMMERCIAL

## 2019-06-19 VITALS
DIASTOLIC BLOOD PRESSURE: 78 MMHG | OXYGEN SATURATION: 96 % | RESPIRATION RATE: 2 BRPM | SYSTOLIC BLOOD PRESSURE: 144 MMHG | TEMPERATURE: 97 F

## 2019-06-19 DIAGNOSIS — E78.2 MIXED HYPERLIPIDEMIA: Chronic | ICD-10-CM

## 2019-06-19 DIAGNOSIS — R10.31 GROIN PAIN, RIGHT: Primary | ICD-10-CM

## 2019-06-19 PROBLEM — I48.19 PERSISTENT ATRIAL FIBRILLATION (HCC): Status: ACTIVE | Noted: 2019-06-19

## 2019-06-19 LAB
A/G RATIO: 1.8 (ref 1.1–2.2)
ABO/RH: NORMAL
ACTIVATED CLOTTING TIME: 220 SEC (ref 99–130)
ACTIVATED CLOTTING TIME: 303 SEC (ref 99–130)
ACTIVATED CLOTTING TIME: 309 SEC (ref 99–130)
ACTIVATED CLOTTING TIME: 354 SEC (ref 99–130)
ACTIVATED CLOTTING TIME: 357 SEC (ref 99–130)
ACTIVATED CLOTTING TIME: 369 SEC (ref 99–130)
ACTIVATED CLOTTING TIME: >1005 SEC (ref 99–130)
ALBUMIN SERPL-MCNC: 4.4 G/DL (ref 3.4–5)
ALP BLD-CCNC: 77 U/L (ref 40–129)
ALT SERPL-CCNC: 52 U/L (ref 10–40)
ANION GAP SERPL CALCULATED.3IONS-SCNC: 12 MMOL/L (ref 3–16)
ANTIBODY SCREEN: NORMAL
AST SERPL-CCNC: 34 U/L (ref 15–37)
BILIRUB SERPL-MCNC: 0.4 MG/DL (ref 0–1)
BUN BLDV-MCNC: 15 MG/DL (ref 7–20)
CALCIUM SERPL-MCNC: 9.4 MG/DL (ref 8.3–10.6)
CHLORIDE BLD-SCNC: 103 MMOL/L (ref 99–110)
CO2: 24 MMOL/L (ref 21–32)
CREAT SERPL-MCNC: 1 MG/DL (ref 0.6–1.2)
EKG ATRIAL RATE: 53 BPM
EKG DIAGNOSIS: NORMAL
EKG P AXIS: 1 DEGREES
EKG P-R INTERVAL: 186 MS
EKG Q-T INTERVAL: 476 MS
EKG QRS DURATION: 90 MS
EKG QTC CALCULATION (BAZETT): 446 MS
EKG R AXIS: 6 DEGREES
EKG T AXIS: -25 DEGREES
EKG VENTRICULAR RATE: 53 BPM
GFR AFRICAN AMERICAN: >60
GFR NON-AFRICAN AMERICAN: 56
GLOBULIN: 2.4 G/DL
GLUCOSE BLD-MCNC: 125 MG/DL (ref 70–99)
GLUCOSE BLD-MCNC: 174 MG/DL (ref 70–99)
HCT VFR BLD CALC: 42.8 % (ref 36–48)
HCT VFR BLD CALC: 43.8 % (ref 36–48)
HEMOGLOBIN: 14.4 G/DL (ref 12–16)
HEMOGLOBIN: 14.6 G/DL (ref 12–16)
INR BLD: 1.01 (ref 0.86–1.14)
LV EF: 58 %
LVEF MODALITY: NORMAL
MAGNESIUM: 2.1 MG/DL (ref 1.8–2.4)
MCH RBC QN AUTO: 29.4 PG (ref 26–34)
MCHC RBC AUTO-ENTMCNC: 33.7 G/DL (ref 31–36)
MCV RBC AUTO: 87.1 FL (ref 80–100)
PDW BLD-RTO: 16.9 % (ref 12.4–15.4)
PERFORMED ON: ABNORMAL
PLATELET # BLD: 169 K/UL (ref 135–450)
PMV BLD AUTO: 9.2 FL (ref 5–10.5)
POC ACT LR: 367 SEC
POTASSIUM SERPL-SCNC: 4.2 MMOL/L (ref 3.5–5.1)
PROTHROMBIN TIME: 11.5 SEC (ref 9.8–13)
RBC # BLD: 4.91 M/UL (ref 4–5.2)
SODIUM BLD-SCNC: 139 MMOL/L (ref 136–145)
TOTAL PROTEIN: 6.8 G/DL (ref 6.4–8.2)
WBC # BLD: 4 K/UL (ref 4–11)

## 2019-06-19 PROCEDURE — 93613 INTRACARDIAC EPHYS 3D MAPG: CPT

## 2019-06-19 PROCEDURE — 6370000000 HC RX 637 (ALT 250 FOR IP): Performed by: NURSE PRACTITIONER

## 2019-06-19 PROCEDURE — 85027 COMPLETE CBC AUTOMATED: CPT

## 2019-06-19 PROCEDURE — 93656 COMPRE EP EVAL ABLTJ ATR FIB: CPT

## 2019-06-19 PROCEDURE — 2580000003 HC RX 258: Performed by: NURSE ANESTHETIST, CERTIFIED REGISTERED

## 2019-06-19 PROCEDURE — 93010 ELECTROCARDIOGRAM REPORT: CPT | Performed by: INTERNAL MEDICINE

## 2019-06-19 PROCEDURE — 2720000010 HC SURG SUPPLY STERILE

## 2019-06-19 PROCEDURE — C1894 INTRO/SHEATH, NON-LASER: HCPCS

## 2019-06-19 PROCEDURE — 3700000001 HC ADD 15 MINUTES (ANESTHESIA)

## 2019-06-19 PROCEDURE — 2500000003 HC RX 250 WO HCPCS

## 2019-06-19 PROCEDURE — 94761 N-INVAS EAR/PLS OXIMETRY MLT: CPT

## 2019-06-19 PROCEDURE — C1759 CATH, INTRA ECHOCARDIOGRAPHY: HCPCS

## 2019-06-19 PROCEDURE — C1732 CATH, EP, DIAG/ABL, 3D/VECT: HCPCS

## 2019-06-19 PROCEDURE — 36415 COLL VENOUS BLD VENIPUNCTURE: CPT

## 2019-06-19 PROCEDURE — 85347 COAGULATION TIME ACTIVATED: CPT

## 2019-06-19 PROCEDURE — 85014 HEMATOCRIT: CPT

## 2019-06-19 PROCEDURE — 93623 PRGRMD STIMJ&PACG IV RX NFS: CPT

## 2019-06-19 PROCEDURE — 93657 TX L/R ATRIAL FIB ADDL: CPT | Performed by: INTERNAL MEDICINE

## 2019-06-19 PROCEDURE — C1769 GUIDE WIRE: HCPCS

## 2019-06-19 PROCEDURE — 93655 ICAR CATH ABLTJ DSCRT ARRHYT: CPT | Performed by: INTERNAL MEDICINE

## 2019-06-19 PROCEDURE — 93662 INTRACARDIAC ECG (ICE): CPT

## 2019-06-19 PROCEDURE — 93623 PRGRMD STIMJ&PACG IV RX NFS: CPT | Performed by: INTERNAL MEDICINE

## 2019-06-19 PROCEDURE — 93325 DOPPLER ECHO COLOR FLOW MAPG: CPT

## 2019-06-19 PROCEDURE — 85610 PROTHROMBIN TIME: CPT

## 2019-06-19 PROCEDURE — 86850 RBC ANTIBODY SCREEN: CPT

## 2019-06-19 PROCEDURE — 93655 ICAR CATH ABLTJ DSCRT ARRHYT: CPT

## 2019-06-19 PROCEDURE — 2580000003 HC RX 258

## 2019-06-19 PROCEDURE — 2500000003 HC RX 250 WO HCPCS: Performed by: NURSE ANESTHETIST, CERTIFIED REGISTERED

## 2019-06-19 PROCEDURE — 93656 COMPRE EP EVAL ABLTJ ATR FIB: CPT | Performed by: INTERNAL MEDICINE

## 2019-06-19 PROCEDURE — 2700000000 HC OXYGEN THERAPY PER DAY

## 2019-06-19 PROCEDURE — 93657 TX L/R ATRIAL FIB ADDL: CPT

## 2019-06-19 PROCEDURE — 83735 ASSAY OF MAGNESIUM: CPT

## 2019-06-19 PROCEDURE — 86901 BLOOD TYPING SEROLOGIC RH(D): CPT

## 2019-06-19 PROCEDURE — 7100000001 HC PACU RECOVERY - ADDTL 15 MIN

## 2019-06-19 PROCEDURE — 6360000002 HC RX W HCPCS: Performed by: NURSE ANESTHETIST, CERTIFIED REGISTERED

## 2019-06-19 PROCEDURE — 80053 COMPREHEN METABOLIC PANEL: CPT

## 2019-06-19 PROCEDURE — 93662 INTRACARDIAC ECG (ICE): CPT | Performed by: INTERNAL MEDICINE

## 2019-06-19 PROCEDURE — 93312 ECHO TRANSESOPHAGEAL: CPT

## 2019-06-19 PROCEDURE — 93320 DOPPLER ECHO COMPLETE: CPT

## 2019-06-19 PROCEDURE — 93613 INTRACARDIAC EPHYS 3D MAPG: CPT | Performed by: INTERNAL MEDICINE

## 2019-06-19 PROCEDURE — 6360000002 HC RX W HCPCS

## 2019-06-19 PROCEDURE — 3700000000 HC ANESTHESIA ATTENDED CARE

## 2019-06-19 PROCEDURE — 6370000000 HC RX 637 (ALT 250 FOR IP): Performed by: INTERNAL MEDICINE

## 2019-06-19 PROCEDURE — 7100000000 HC PACU RECOVERY - FIRST 15 MIN

## 2019-06-19 PROCEDURE — 85018 HEMOGLOBIN: CPT

## 2019-06-19 PROCEDURE — 86900 BLOOD TYPING SEROLOGIC ABO: CPT

## 2019-06-19 PROCEDURE — 93005 ELECTROCARDIOGRAM TRACING: CPT | Performed by: INTERNAL MEDICINE

## 2019-06-19 RX ORDER — CYCLOBENZAPRINE HCL 10 MG
10 TABLET ORAL 3 TIMES DAILY PRN
Status: DISCONTINUED | OUTPATIENT
Start: 2019-06-19 | End: 2019-06-20 | Stop reason: HOSPADM

## 2019-06-19 RX ORDER — METOPROLOL TARTRATE 50 MG/1
50 TABLET, FILM COATED ORAL 2 TIMES DAILY
Status: DISCONTINUED | OUTPATIENT
Start: 2019-06-19 | End: 2019-06-20 | Stop reason: HOSPADM

## 2019-06-19 RX ORDER — ALPRAZOLAM 0.25 MG/1
0.25 TABLET ORAL NIGHTLY PRN
Status: DISCONTINUED | OUTPATIENT
Start: 2019-06-19 | End: 2019-06-20 | Stop reason: HOSPADM

## 2019-06-19 RX ORDER — FUROSEMIDE 10 MG/ML
INJECTION INTRAMUSCULAR; INTRAVENOUS PRN
Status: DISCONTINUED | OUTPATIENT
Start: 2019-06-19 | End: 2019-06-19 | Stop reason: SDUPTHER

## 2019-06-19 RX ORDER — HEPARIN SODIUM 1000 [USP'U]/ML
INJECTION, SOLUTION INTRAVENOUS; SUBCUTANEOUS PRN
Status: DISCONTINUED | OUTPATIENT
Start: 2019-06-19 | End: 2019-06-19 | Stop reason: SDUPTHER

## 2019-06-19 RX ORDER — DEXAMETHASONE SODIUM PHOSPHATE 4 MG/ML
INJECTION, SOLUTION INTRA-ARTICULAR; INTRALESIONAL; INTRAMUSCULAR; INTRAVENOUS; SOFT TISSUE PRN
Status: DISCONTINUED | OUTPATIENT
Start: 2019-06-19 | End: 2019-06-19 | Stop reason: SDUPTHER

## 2019-06-19 RX ORDER — SODIUM CHLORIDE 9 MG/ML
INJECTION, SOLUTION INTRAVENOUS CONTINUOUS PRN
Status: DISCONTINUED | OUTPATIENT
Start: 2019-06-19 | End: 2019-06-19 | Stop reason: SDUPTHER

## 2019-06-19 RX ORDER — TRAMADOL HYDROCHLORIDE 50 MG/1
100 TABLET ORAL EVERY 6 HOURS PRN
Status: DISCONTINUED | OUTPATIENT
Start: 2019-06-19 | End: 2019-06-20 | Stop reason: HOSPADM

## 2019-06-19 RX ORDER — LEVOTHYROXINE SODIUM 112 UG/1
112 TABLET ORAL DAILY
Status: DISCONTINUED | OUTPATIENT
Start: 2019-06-20 | End: 2019-06-20 | Stop reason: HOSPADM

## 2019-06-19 RX ORDER — SODIUM CHLORIDE 0.9 % (FLUSH) 0.9 %
10 SYRINGE (ML) INJECTION PRN
Status: DISCONTINUED | OUTPATIENT
Start: 2019-06-19 | End: 2019-06-20 | Stop reason: HOSPADM

## 2019-06-19 RX ORDER — ROSUVASTATIN CALCIUM 10 MG/1
5 TABLET, COATED ORAL NIGHTLY
Status: DISCONTINUED | OUTPATIENT
Start: 2019-06-19 | End: 2019-06-20 | Stop reason: HOSPADM

## 2019-06-19 RX ORDER — ROCURONIUM BROMIDE 10 MG/ML
INJECTION, SOLUTION INTRAVENOUS PRN
Status: DISCONTINUED | OUTPATIENT
Start: 2019-06-19 | End: 2019-06-19 | Stop reason: SDUPTHER

## 2019-06-19 RX ORDER — MIDAZOLAM HYDROCHLORIDE 1 MG/ML
INJECTION INTRAMUSCULAR; INTRAVENOUS PRN
Status: DISCONTINUED | OUTPATIENT
Start: 2019-06-19 | End: 2019-06-19 | Stop reason: SDUPTHER

## 2019-06-19 RX ORDER — SUCCINYLCHOLINE CHLORIDE 20 MG/ML
INJECTION INTRAMUSCULAR; INTRAVENOUS PRN
Status: DISCONTINUED | OUTPATIENT
Start: 2019-06-19 | End: 2019-06-19 | Stop reason: SDUPTHER

## 2019-06-19 RX ORDER — GLYCOPYRROLATE 0.2 MG/ML
INJECTION INTRAMUSCULAR; INTRAVENOUS PRN
Status: DISCONTINUED | OUTPATIENT
Start: 2019-06-19 | End: 2019-06-19 | Stop reason: SDUPTHER

## 2019-06-19 RX ORDER — FENTANYL CITRATE 50 UG/ML
INJECTION, SOLUTION INTRAMUSCULAR; INTRAVENOUS PRN
Status: DISCONTINUED | OUTPATIENT
Start: 2019-06-19 | End: 2019-06-19 | Stop reason: SDUPTHER

## 2019-06-19 RX ORDER — PROPOFOL 10 MG/ML
INJECTION, EMULSION INTRAVENOUS PRN
Status: DISCONTINUED | OUTPATIENT
Start: 2019-06-19 | End: 2019-06-19 | Stop reason: SDUPTHER

## 2019-06-19 RX ORDER — ONDANSETRON 2 MG/ML
INJECTION INTRAMUSCULAR; INTRAVENOUS PRN
Status: DISCONTINUED | OUTPATIENT
Start: 2019-06-19 | End: 2019-06-19 | Stop reason: SDUPTHER

## 2019-06-19 RX ORDER — HEPARIN SODIUM 10000 [USP'U]/100ML
INJECTION, SOLUTION INTRAVENOUS CONTINUOUS PRN
Status: DISCONTINUED | OUTPATIENT
Start: 2019-06-19 | End: 2019-06-19 | Stop reason: SDUPTHER

## 2019-06-19 RX ORDER — SIMVASTATIN 10 MG
20 TABLET ORAL NIGHTLY
Status: DISCONTINUED | OUTPATIENT
Start: 2019-06-19 | End: 2019-06-19 | Stop reason: CLARIF

## 2019-06-19 RX ORDER — AMIODARONE HYDROCHLORIDE 200 MG/1
200 TABLET ORAL DAILY
Status: DISCONTINUED | OUTPATIENT
Start: 2019-06-19 | End: 2019-06-20 | Stop reason: HOSPADM

## 2019-06-19 RX ORDER — SODIUM CHLORIDE 0.9 % (FLUSH) 0.9 %
10 SYRINGE (ML) INJECTION EVERY 12 HOURS SCHEDULED
Status: DISCONTINUED | OUTPATIENT
Start: 2019-06-19 | End: 2019-06-20 | Stop reason: HOSPADM

## 2019-06-19 RX ORDER — NEOSTIGMINE METHYLSULFATE 5 MG/5 ML
SYRINGE (ML) INTRAVENOUS PRN
Status: DISCONTINUED | OUTPATIENT
Start: 2019-06-19 | End: 2019-06-19 | Stop reason: SDUPTHER

## 2019-06-19 RX ORDER — PANTOPRAZOLE SODIUM 40 MG/1
40 TABLET, DELAYED RELEASE ORAL DAILY
Status: DISCONTINUED | OUTPATIENT
Start: 2019-06-20 | End: 2019-06-20 | Stop reason: HOSPADM

## 2019-06-19 RX ORDER — ACETAMINOPHEN 325 MG/1
650 TABLET ORAL EVERY 4 HOURS PRN
Status: DISCONTINUED | OUTPATIENT
Start: 2019-06-19 | End: 2019-06-20 | Stop reason: HOSPADM

## 2019-06-19 RX ORDER — TRAMADOL HYDROCHLORIDE 50 MG/1
50 TABLET ORAL EVERY 6 HOURS PRN
Status: DISCONTINUED | OUTPATIENT
Start: 2019-06-19 | End: 2019-06-20 | Stop reason: HOSPADM

## 2019-06-19 RX ADMIN — AMIODARONE HYDROCHLORIDE 200 MG: 200 TABLET ORAL at 20:41

## 2019-06-19 RX ADMIN — MIDAZOLAM HYDROCHLORIDE 2 MG: 1 INJECTION, SOLUTION INTRAMUSCULAR; INTRAVENOUS at 07:54

## 2019-06-19 RX ADMIN — SODIUM CHLORIDE: 9 INJECTION, SOLUTION INTRAVENOUS at 07:40

## 2019-06-19 RX ADMIN — RIVAROXABAN 20 MG: 20 TABLET, FILM COATED ORAL at 16:44

## 2019-06-19 RX ADMIN — HEPARIN SODIUM 4000 UNITS: 1000 INJECTION, SOLUTION INTRAVENOUS; SUBCUTANEOUS at 10:01

## 2019-06-19 RX ADMIN — ACETAMINOPHEN 650 MG: 325 TABLET, FILM COATED ORAL at 16:44

## 2019-06-19 RX ADMIN — HEPARIN SODIUM 5000 UNITS: 1000 INJECTION, SOLUTION INTRAVENOUS; SUBCUTANEOUS at 09:09

## 2019-06-19 RX ADMIN — METOPROLOL TARTRATE 50 MG: 50 TABLET, FILM COATED ORAL at 20:40

## 2019-06-19 RX ADMIN — PHENYLEPHRINE HYDROCHLORIDE 50 MCG/MIN: 10 INJECTION INTRAVENOUS at 08:07

## 2019-06-19 RX ADMIN — PROPOFOL 50 MG: 10 INJECTION, EMULSION INTRAVENOUS at 09:37

## 2019-06-19 RX ADMIN — ROCURONIUM BROMIDE 15 MG: 10 INJECTION, SOLUTION INTRAVENOUS at 08:01

## 2019-06-19 RX ADMIN — CYCLOBENZAPRINE HYDROCHLORIDE 10 MG: 10 TABLET, FILM COATED ORAL at 19:10

## 2019-06-19 RX ADMIN — HEPARIN SODIUM 5000 UNITS: 1000 INJECTION, SOLUTION INTRAVENOUS; SUBCUTANEOUS at 09:22

## 2019-06-19 RX ADMIN — GLYCOPYRROLATE 0.2 MG: 0.2 INJECTION, SOLUTION INTRAMUSCULAR; INTRAVENOUS at 08:34

## 2019-06-19 RX ADMIN — PROPOFOL 200 MG: 10 INJECTION, EMULSION INTRAVENOUS at 08:02

## 2019-06-19 RX ADMIN — Medication 2 MG: at 11:42

## 2019-06-19 RX ADMIN — SODIUM CHLORIDE: 9 INJECTION, SOLUTION INTRAVENOUS at 11:50

## 2019-06-19 RX ADMIN — PROPOFOL 50 MG: 10 INJECTION, EMULSION INTRAVENOUS at 08:11

## 2019-06-19 RX ADMIN — ONDANSETRON 4 MG: 2 INJECTION INTRAMUSCULAR; INTRAVENOUS at 11:36

## 2019-06-19 RX ADMIN — DEXAMETHASONE SODIUM PHOSPHATE 8 MG: 4 INJECTION, SOLUTION INTRA-ARTICULAR; INTRALESIONAL; INTRAMUSCULAR; INTRAVENOUS; SOFT TISSUE at 08:07

## 2019-06-19 RX ADMIN — HEPARIN SODIUM 5000 UNITS: 1000 INJECTION, SOLUTION INTRAVENOUS; SUBCUTANEOUS at 08:47

## 2019-06-19 RX ADMIN — FUROSEMIDE 40 MG: 10 INJECTION, SOLUTION INTRAMUSCULAR; INTRAVENOUS at 11:36

## 2019-06-19 RX ADMIN — TRAMADOL HYDROCHLORIDE 50 MG: 50 TABLET, FILM COATED ORAL at 19:10

## 2019-06-19 RX ADMIN — ROSUVASTATIN CALCIUM 5 MG: 10 TABLET, FILM COATED ORAL at 20:40

## 2019-06-19 RX ADMIN — GLYCOPYRROLATE 0.2 MG: 0.2 INJECTION, SOLUTION INTRAMUSCULAR; INTRAVENOUS at 11:42

## 2019-06-19 RX ADMIN — CYCLOBENZAPRINE HYDROCHLORIDE 10 MG: 10 TABLET, FILM COATED ORAL at 23:57

## 2019-06-19 RX ADMIN — FENTANYL CITRATE 50 MCG: 50 INJECTION INTRAMUSCULAR; INTRAVENOUS at 07:56

## 2019-06-19 RX ADMIN — SUCCINYLCHOLINE CHLORIDE 120 MG: 20 INJECTION, SOLUTION INTRAMUSCULAR; INTRAVENOUS at 08:02

## 2019-06-19 RX ADMIN — HEPARIN SODIUM 2000 UNITS: 1000 INJECTION, SOLUTION INTRAVENOUS; SUBCUTANEOUS at 09:38

## 2019-06-19 RX ADMIN — TRAMADOL HYDROCHLORIDE 50 MG: 50 TABLET, FILM COATED ORAL at 20:41

## 2019-06-19 RX ADMIN — ISOPROTERENOL HYDROCHLORIDE 5 MCG/MIN: 0.2 INJECTION, SOLUTION INTRAMUSCULAR; INTRAVENOUS at 10:33

## 2019-06-19 RX ADMIN — HEPARIN SODIUM 1000 UNITS/HR: 10000 INJECTION, SOLUTION INTRAVENOUS at 09:41

## 2019-06-19 ASSESSMENT — PULMONARY FUNCTION TESTS
PIF_VALUE: 23
PIF_VALUE: 20
PIF_VALUE: 23
PIF_VALUE: 20
PIF_VALUE: 23
PIF_VALUE: 20
PIF_VALUE: 23
PIF_VALUE: 5
PIF_VALUE: 20
PIF_VALUE: 23
PIF_VALUE: 20
PIF_VALUE: 23
PIF_VALUE: 20
PIF_VALUE: 23
PIF_VALUE: 21
PIF_VALUE: 23
PIF_VALUE: 20
PIF_VALUE: 4
PIF_VALUE: 20
PIF_VALUE: 23
PIF_VALUE: 20
PIF_VALUE: 23
PIF_VALUE: 20
PIF_VALUE: 23
PIF_VALUE: 20
PIF_VALUE: 23
PIF_VALUE: 20
PIF_VALUE: 23
PIF_VALUE: 20
PIF_VALUE: 23
PIF_VALUE: 20
PIF_VALUE: 2
PIF_VALUE: 23
PIF_VALUE: 20
PIF_VALUE: 23
PIF_VALUE: 20
PIF_VALUE: 20
PIF_VALUE: 23
PIF_VALUE: 21
PIF_VALUE: 23
PIF_VALUE: 20
PIF_VALUE: 23
PIF_VALUE: 20
PIF_VALUE: 23
PIF_VALUE: 20
PIF_VALUE: 23
PIF_VALUE: 20
PIF_VALUE: 23
PIF_VALUE: 1
PIF_VALUE: 23
PIF_VALUE: 20
PIF_VALUE: 23
PIF_VALUE: 20
PIF_VALUE: 23
PIF_VALUE: 3
PIF_VALUE: 23
PIF_VALUE: 20
PIF_VALUE: 20
PIF_VALUE: 23
PIF_VALUE: 3
PIF_VALUE: 20
PIF_VALUE: 23
PIF_VALUE: 20
PIF_VALUE: 23
PIF_VALUE: 23
PIF_VALUE: 20
PIF_VALUE: 23
PIF_VALUE: 23
PIF_VALUE: 20
PIF_VALUE: 23
PIF_VALUE: 21
PIF_VALUE: 20
PIF_VALUE: 23
PIF_VALUE: 4
PIF_VALUE: 23
PIF_VALUE: 20
PIF_VALUE: 23
PIF_VALUE: 20
PIF_VALUE: 23
PIF_VALUE: 20
PIF_VALUE: 23
PIF_VALUE: 21
PIF_VALUE: 23
PIF_VALUE: 20
PIF_VALUE: 23
PIF_VALUE: 23
PIF_VALUE: 20
PIF_VALUE: 23
PIF_VALUE: 20
PIF_VALUE: 23
PIF_VALUE: 20
PIF_VALUE: 23
PIF_VALUE: 23
PIF_VALUE: 25
PIF_VALUE: 23
PIF_VALUE: 0
PIF_VALUE: 23
PIF_VALUE: 20
PIF_VALUE: 20
PIF_VALUE: 23
PIF_VALUE: 20
PIF_VALUE: 23
PIF_VALUE: 20
PIF_VALUE: 5
PIF_VALUE: 23
PIF_VALUE: 20
PIF_VALUE: 23
PIF_VALUE: 23
PIF_VALUE: 20
PIF_VALUE: 20
PIF_VALUE: 23
PIF_VALUE: 20
PIF_VALUE: 21
PIF_VALUE: 23
PIF_VALUE: 20
PIF_VALUE: 20
PIF_VALUE: 23
PIF_VALUE: 23
PIF_VALUE: 20
PIF_VALUE: 20
PIF_VALUE: 23
PIF_VALUE: 23
PIF_VALUE: 20
PIF_VALUE: 20
PIF_VALUE: 23
PIF_VALUE: 20
PIF_VALUE: 23
PIF_VALUE: 20

## 2019-06-19 ASSESSMENT — PAIN DESCRIPTION - LOCATION: LOCATION: GROIN

## 2019-06-19 ASSESSMENT — PAIN DESCRIPTION - DESCRIPTORS: DESCRIPTORS: SORE;SPASM

## 2019-06-19 ASSESSMENT — PAIN DESCRIPTION - FREQUENCY: FREQUENCY: INTERMITTENT

## 2019-06-19 ASSESSMENT — PAIN SCALES - GENERAL
PAINLEVEL_OUTOF10: 4
PAINLEVEL_OUTOF10: 0
PAINLEVEL_OUTOF10: 6
PAINLEVEL_OUTOF10: 0
PAINLEVEL_OUTOF10: 6
PAINLEVEL_OUTOF10: 8

## 2019-06-19 ASSESSMENT — PAIN DESCRIPTION - ORIENTATION: ORIENTATION: INNER

## 2019-06-19 NOTE — H&P
[facility]   Electrophysiology Follow up  Date: 6/19/2019    CC:  Follow up for atrial fibrillation  HPI: Alejandra Hopper is a 59 y.o. history of atrial flutter, AF, HTN and hyperlipidemia. Atrial fib first started about around 2010, and she was symptomatic with it. Treated with Rythmol but continued having symptoms.      On 12/15/16 - s/p ablation of Atrial fib with PVI, ablation of CVTI atrial flutter. 48 hr holter monitor 4/10/17 > SR with avg HR 61 ().    Antiarrhythmic therapy Amiodarone was discontinued last visit in July. She was started on propafenone 225mg daily at [address] 4/9/2018 for recurrent atrial fibrillation. This was not effective and she was restarted back on amiodarone. Since last OV she was supposed to have DCCV the week of 4/16/18 but was in NSR when she arrived for procedure. On 4/17/18 she was scheduled for stress testing and was found to be in atrial fib. She restarted amiodarone on 5/1/2018    She had recurrent episodes of atrial fibrillation. These episodes are symptomatic. She has been here for redo ablation of atrial fibrillation. Past Medical History:   Diagnosis Date    A-fib ([facility] [address]     Anxiety     CHF (congestive heart failure) (HCC)     COPD (chronic obstructive pulmonary disease) (HCC)     Depression     Heart palpitations     Hyperlipidemia     Hypertension     Sleep apnea     Urinary incontinence         Past Surgical History:   Procedure Laterality Date    ATRIAL ABLATION SURGERY      BREAST BIOPSY      CARPAL TUNNEL RELEASE      CYST REMOVAL      both breast    THORACOTOMY         Allergies   Allergen Reactions    Vicodin [Hydrocodone-Acetaminophen] Nausea And Vomiting       Social History:   reports that she quit smoking about 9 years ago. She has never used smokeless tobacco. She reports that she does not drink alcohol or use drugs.      Family History:  family history includes Cancer in her maternal grandfather, paternal grandfather, and sister; Diabetes in her maternal grandmother and mother; Glaucoma in her father; Heart Disease in her mother; High Blood Pressure in her mother; High Cholesterol in her brother, mother, and sister; Stroke in her mother. Review of System:  General: negative for fever, chills  + weight gain   Ophthalmic ROS: negative for - eye pain or loss of vision  ENT ROS: negative for - headaches, sore throat   Respiratory: negative for - cough, sputum    Cardiovascular: Reviewed in HPI. Gastrointestinal: negative for - abdominal pain, diarrhea, N/V  Hematology: negative for - bleeding, blood clots, bruising or jaundice  Genito-Urinary:  negative for - Dysuria or incontinence  Musculoskeletal: negative for - Joint swelling, muscle pain  Neurological: negative for - confusion, dizziness, headaches   Psychiatric: No anxiety, no depression. Dermatological: negative for - rash     121/69    55   18   97.3   100%. · Constitutional: Oriented. No distress. · Head: Normocephalic and atraumatic. · Mouth/Throat: Oropharynx is clear and moist.   · Eyes: Conjunctivae normal. EOM are normal.   · Neck: Neck supple. No rigidity. No JVD present. · Cardiovascular: Normal rate, regular rhythm, S1&S2. · Pulmonary/Chest: Bilateral respiratory sounds. No wheezes, No rhonchi. · Abdominal: Soft. Bowel sounds present. No distension, No tenderness. · Musculoskeletal: No tenderness. No edema    · Lymphadenopathy: Has no cervical adenopathy. · Neurological: Alert and oriented. Cranial nerve appears intact, No Gross deficit   · Skin: Skin is warm and dry. No rash noted.    · Psychiatric: Has a normal behavior       Labs:  Lab Results   Component Value Date    TSHREFLEX 2.97 12/12/2017    TSH 14.33 07/17/2018    TSH 3.76 06/26/2017    CREATININE 0.9 11/10/2018    CREATININE 1.09 07/17/2018    AST 28 11/10/2018    ALT 50 11/10/2018       Echo: 5/1/2018  LVEF 60-65%,   LV wall motion is normal,   LA is moderately dilated,   the MV Low    SUSIE (obstructive sleep apnea) 05/13/2011     Priority: Low    Paroxysmal atrial fibrillation ([facility] [address] 05/13/2011     Priority: Low    Hyperlipidemia 05/13/2011     Priority: Low        Assessment and plan:     - Recurrent atrial fibrillation/flutter   Patient has recurrent episodes of palpitation and atrial fibrillation which is symptomatic associated with fatigue despite taking amiodarone. MCOT 5/9-6/7/18 showed PAT, AF   -12/15/16 - A fib ablation with PVI, ablation of CVTI atrial flutter   The right superior vein has a very high ostium and separates from the roof. Also RIPV ostium was large too. Treatment options including cardioversion and/or ablation were discussed with the patient. Risks, benefits and alternative of each treatment options were explained. All questions answered. - Ablation for atrial fibrillation discussed. The risks, benefits and alternatives of the ablation procedure were discussed with the patient. The risks including, but not limited to, the risks of bleeding, infection, radiation exposure, injury to vascular, cardiac and surrounding structures (including pneumothorax), stroke, cardiac perforation, tamponade, need for emergent open heart surgery, need for pacemaker implantation, Injury to the phrenic nerve, injury to the esophagus, myocardial infarction and death were discussed in detail. The patient opted to proceed with the ablation. No CTA needed. Hold Xarelto the night before ablation. Will proceed with RFCA. HTN:     There were no vitals taken for this visit. Controlled. Metoprolol 50 mg bid    - SUSIE:    uses CPAP. - Morbid Obesity: There is no height or weight on file to calculate BMI. - Nathalie [address] [place] [address] management and have lost weight. Thank you for allowing me to participate in the care of Shayy Hernandez. Further evaluation will be based upon the patient's clinical course and testing results.      All questions and concerns were addressed to the patient/family. Alternatives to my treatment were discussed. I have discussed the above stated plan and the patient verbalized understanding and agreed with the plan. NOTE: This report was transcribed using voice recognition software. Every effort was made to ensure accuracy, however, inadvertent computerized transcription errors may be present.       Renée Crook MD, MPH  [facility]   Office: [phone]

## 2019-06-19 NOTE — PROGRESS NOTES
Patient admitted to PACU via bed, arouses to stimuli, moves ext to command. Respirations adeq on 8L o2 per simple mask spo2 97%. Skin warm and dry with good color. Right groin site with figure 8 suture intact, area soft. Palp DP and PT pulses right foot. Patient denies pain at this time,. Will continue to monitor.

## 2019-06-19 NOTE — PROCEDURES
locate phrenic capture and avoid injury during ablation. This resulted in isolation of all 4 pulmonary veins and posterior wall. Patient was started on Isupril which increased to 10. Burst pacing and programmed stimulation was performed on Isupril. An atypical atrial flutter was induced with a cycle length of 230 to 240 ms. Cycle length was variable. The earliest left-sided activation was on CS distal. Using Carto navigation system, Ripple mapping of left atrium was created. On the anterior wall there was a large area of fractionated signals. An anterior line was created through this area of fractionated signal and connected the mitral annulus to the right superior pulmonary vein. This terminated atrial flutter and sinus rhythm was restored. Also after Isupril infusion reconnection on the inferior line was noted which was re-ablated and posterior wall was re-isolated. Pacing with high output over the ablation lines was performed and areas that had captures on the line was further ablated till no further capture noted. Exit block was checked with high amp pacing inside and over the ablation lines. Both entrance and exit block was confirmed using circular catheter and pacing maneuvers after 30 minutes waiting time. Also posterior box isolation was reconfirmed again by high amp pacing on the posterior wall. On the right side bidirectional block across the CTI line was confirmed. After ablation EP study and programmed stimulation was performed to rule out any other arrhythmia.  His bundle potentials was recorded and pacing was performed from right atrium, RV apex with the following results:     AH interval was 74 msec  HV interval was 45 msec  Pacing from right atrium, 1:1 conduction over AV node with (AV wenckebach) was 320 msec  Pacing from atrium, AV haresh ERP was  500/230  msec   Atrial ERP was 500/230 msec   Pacing from RV apex, 1:1 No retrograde conduction over AV node (VA elpidio) was noted     Procedure was performed with intracardiac ultrasound and 3D mapping system without using fluoroscopy. At the end of the procedure, using ICE we confirmed the lack of any pericardial effusion. Figure of 8 suture was used and sheaths were removed using manual compression. The patient tolerated the procedure well and there were no complications. Patient was extubated and transferred to the floor in stable condition. Conclusion:     - Pulmonary vein re-isolations using wide area circumferential radiofrequency ablation   - Additional ablation of roof line, and inferior line with posterior box isolation  - Additional ablation of atypical left sided atrial flutter     Plan:   The patient will be admitted overnight to CVU. He will receive usual post ablation care.      Carleen Bucio MD, MPH  [place] [address]   Office: [phone]

## 2019-06-19 NOTE — PROGRESS NOTES
Patient admitted to CVU from PACU and attached to CVU monitors. Report received from PACU RN. Hemodynamics stable and will continue to monitor. Right groin puncture site soft, not oozing, and dry sterile dressing on site. Figure of eight suture intact. Suture to be removed at 1745 per MD orders. Patient oriented to room and call light within reach. Family let back to see patient. Visiting hours reviewed and all questions answered.

## 2019-06-19 NOTE — ANESTHESIA PRE PROCEDURE
Department of Anesthesiology  Preprocedure Note       Name:  Tarsha Jiang   Age:  59 y.o.  :  1960                                          MRN:  [id number]         Date:  2019      Surgeon: cardiac ablation, atrial    Procedure: ECHO DEEPIKA IN CARDIAC CATH    Medications prior to admission:   Prior to Admission medications    Medication Sig Start Date End Date Taking? Authorizing Provider   metoprolol tartrate (LOPRESSOR) 50 MG tablet TAKE 1 TABLET BY MOUTH 2 TIMES DAILY. 5/3/19   Gee Adams MD   amiodarone (CORDARONE) 200 MG tablet TAKE 1 TABLET BY MOUTH TWICE A DAY 19   Carlos Alberto Mesa MD   levothyroxine (SYNTHROID) 112 MCG tablet Take 112 mcg by mouth Daily    Historical Provider, MD   XARELTO 20 MG TABS tablet TAKE 1 TABLET BY MOUTH DAILY 10/15/18   Gee Adams MD   pantoprazole (PROTONIX) 40 MG tablet TAKE 1 TABLET BY MOUTH DAILY 18   Carlos Alberto Mesa MD   simvastatin (ZOCOR) 20 MG tablet Take 1 tablet by mouth nightly 18   Carlos Alberto Mesa MD   metoprolol tartrate (LOPRESSOR) 50 MG tablet TAKE 1 TABLET BY MOUTH TWICE A DAY 18   Chau Mckoy MD   Cholecalciferol (VITAMIN D) 2000 units CAPS capsule Take 1 capsule by mouth daily    Historical Provider, MD   ALPRAZolam [facility]l [facility]) 0.25 MG tablet Take 0.25 mg by mouth daily as needed     Historical Provider, MD       Current medications:    Current Outpatient Medications   Medication Sig Dispense Refill    metoprolol tartrate (LOPRESSOR) 50 MG tablet TAKE 1 TABLET BY MOUTH 2 TIMES DAILY.  180 tablet 2    amiodarone (CORDARONE) 200 MG tablet TAKE 1 TABLET BY MOUTH TWICE A DAY 60 tablet 11    levothyroxine (SYNTHROID) 112 MCG tablet Take 112 mcg by mouth Daily      XARELTO 20 MG TABS tablet TAKE 1 TABLET BY MOUTH DAILY 90 tablet 3    pantoprazole (PROTONIX) 40 MG tablet TAKE 1 TABLET BY MOUTH DAILY 90 tablet 1    simvastatin (ZOCOR) 20 MG tablet Take 1 tablet by mouth nightly 90 tablet 0    metoprolol tartrate (LOPRESSOR) 50 MG tablet TAKE 1 TABLET BY MOUTH TWICE A DAY 60 tablet 3    Cholecalciferol (VITAMIN D) 2000 units CAPS capsule Take 1 capsule by mouth daily      ALPRAZolam (XANAX) 0.25 MG tablet Take 0.25 mg by mouth daily as needed        No current facility-administered medications for this encounter. Allergies: Allergies   Allergen Reactions    Vicodin [Hydrocodone-Acetaminophen] Nausea And Vomiting       Problem List:    Patient Active Problem List   Diagnosis Code    Essential hypertension I10    SUSIE (obstructive sleep apnea) G47.33    Paroxysmal atrial fibrillation (HCC) I48.0    Hyperlipidemia E78.5    Atrial flutter ([facility]) I48.92    Obesity due to excess calories E66.09    Vitamin D deficiency E55.9    Prediabetes R73.03    Morbid obesity with BMI of 40.0-44.9, adult ([facility]) E66.01, Z68.41       Past Medical History:        Diagnosis Date    [facility]fib ([facility] [address]     Anxiety     CHF (congestive heart failure) ([facility] [address]     COPD (chronic obstructive pulmonary disease) ([facility] [address]     Depression     Heart palpitations     Hyperlipidemia     Hypertension     Sleep apnea     Urinary incontinence        Past Surgical History:        Procedure Laterality Date    ATRIAL ABLATION SURGERY      BREAST BIOPSY      CARPAL TUNNEL RELEASE      CYST REMOVAL      both breast    THORACOTOMY         Social History:    Social History     Tobacco Use    Smoking status: Former Smoker     Last attempt to quit: 2010     Years since quittin.3    Smokeless tobacco: Never Used   Substance Use Topics    Alcohol use: No                                Counseling given: Not Answered      Vital Signs (Current): There were no vitals filed for this visit.                                            BP Readings from Last 3 Encounters:   19 132/88   18 136/84   18 129/88       NPO Status:                                                                                 BMI:   Wt Readings from Last 3 Encounters: 01/22/19 241 lb 6.4 oz (109.5 kg)   11/06/18 251 lb (113.9 kg)   07/16/18 244 lb 6.4 oz (110.9 kg)     There is no height or weight on file to calculate BMI.    CBC:   Lab Results   Component Value Date    WBC 4.1 11/10/2018    RBC 4.83 11/10/2018    HGB 14.3 11/10/2018    HCT 43.3 11/10/2018    MCV 89.7 11/10/2018    RDW 15.4 11/10/2018     11/10/2018       CMP:   Lab Results   Component Value Date     11/10/2018    K 4.5 11/10/2018     11/10/2018    CO2 23 11/10/2018    BUN 26 11/10/2018    CREATININE 0.9 11/10/2018    GFRAA >60 11/10/2018    GFRAA >60 09/21/2010    AGRATIO 1.8 11/10/2018    LABGLOM >60 11/10/2018    GLUCOSE 114 11/10/2018    GLUCOSE 114 03/27/2012    PROT 6.9 11/10/2018    PROT 6.8 09/21/2010    CALCIUM 9.3 11/10/2018    BILITOT 0.3 11/10/2018    ALKPHOS 74 11/10/2018    AST 28 11/10/2018    ALT 50 11/10/2018       POC Tests: No results for input(s): POCGLU, POCNA, POCK, POCCL, POCBUN, POCHEMO, POCHCT in the last 72 hours.     Coags:   Lab Results   Component Value Date    PROTIME 10.7 12/15/2016    INR 0.94 12/15/2016       HCG (If Applicable): No results found for: PREGTESTUR, PREGSERUM, HCG, HCGQUANT     ABGs: No results found for: PHART, PO2ART, KXZ9SPM, DSO3NLS, BEART, S1ZKXDLQ     Type & Screen (If Applicable):  No results found for: LABABO, LABRH    Anesthesia Evaluation    Airway: Mallampati: III  TM distance: >3 FB   Neck ROM: full  Mouth opening: > = 3 FB Dental:          Pulmonary:   (+) COPD:  sleep apnea:                             Cardiovascular:    (+) hypertension:, dysrhythmias:, CHF:,         Rhythm: regular  Rate: normal                    Neuro/Psych:   (+) psychiatric history:            GI/Hepatic/Renal:             Endo/Other:                     Abdominal:   (+) obese,         Vascular:                                   Conclusions      Summary   Normal left ventricle size, wall thickness and systolic function with an   estimated ejection fraction of 60%. No regional wall motion abnormalities   are seen.      Mild mitral regurgitation is present.      Aneurysmal intra atrial septum. Small PFO with left to right shunt noted.      Signature      ------------------------------------------------------------------   Electronically signed by Guilherme Cooper MD (Interpreting   physician) on 12/16/2016 at 10:47 AM     Anesthesia Plan      general     ASA 3     (Possible escobar.)  Induction: intravenous. Anesthetic plan and risks discussed with patient. Plan discussed with CRNA.                   Ke Moran MD   6/19/2019

## 2019-06-19 NOTE — PROGRESS NOTES
Pt c/o muscle pain in groin. Pt states she has pulled her groin muscle in the past and says it feels the same today as it did before. Pt up to bathroom and right groin site soft, no oozing, bleeding or hematoma noted. Called Dr. Clayton Kim and orders for flexeril and ultram given as well as an order for an H&H. Pt agreeable to this plan.

## 2019-06-19 NOTE — PROGRESS NOTES
Figure of eight suture removed per MD orders. Indwelling howard catheter removed as well. Pt up to bathroom to void and have BM. No bleeding at groin site. Pt educated on signs of bleeding and when to call the nurse if she suspects her groin site is bleeding. Pt verbalized understanding.

## 2019-06-19 NOTE — PLAN OF CARE
Pt doing well. Denies pain. On room air with oxygen saturation >92%. BP stable. Groin site intact with figure of eight suture. Pt educated on when bedrest restrictions are up and when howard catheter will be removed. Pt's son at bedside and updated on pt's status. New orders received.

## 2019-06-19 NOTE — PROGRESS NOTES
Ok to move to CVU. In PACU s/p A fib Ablation with Dr. Aretta Siemens. Will alert anyone in waiting room for them and the nursing unit if applicable. Will continue to monitor for safety and comfort.     Maribell MCGUIREN, RN, [facility]  Pre-Op/Recovery   Same Day Surgery

## 2019-06-20 ENCOUNTER — TELEPHONE (OUTPATIENT)
Dept: CARDIOLOGY CLINIC | Age: 59
End: 2019-06-20

## 2019-06-20 VITALS
BODY MASS INDEX: 45.39 KG/M2 | HEIGHT: 63 IN | DIASTOLIC BLOOD PRESSURE: 65 MMHG | HEART RATE: 65 BPM | RESPIRATION RATE: 16 BRPM | SYSTOLIC BLOOD PRESSURE: 112 MMHG | WEIGHT: 256.17 LBS | TEMPERATURE: 97 F | OXYGEN SATURATION: 98 %

## 2019-06-20 DIAGNOSIS — E78.2 MIXED HYPERLIPIDEMIA: Chronic | ICD-10-CM

## 2019-06-20 PROBLEM — I48.92 ATRIAL FLUTTER (HCC): Status: ACTIVE | Noted: 2017-01-03

## 2019-06-20 LAB
ANION GAP SERPL CALCULATED.3IONS-SCNC: 13 MMOL/L (ref 3–16)
BUN BLDV-MCNC: 18 MG/DL (ref 7–20)
CALCIUM SERPL-MCNC: 8.5 MG/DL (ref 8.3–10.6)
CHLORIDE BLD-SCNC: 103 MMOL/L (ref 99–110)
CO2: 24 MMOL/L (ref 21–32)
CREAT SERPL-MCNC: 0.9 MG/DL (ref 0.6–1.2)
GFR AFRICAN AMERICAN: >60
GFR NON-AFRICAN AMERICAN: >60
GLUCOSE BLD-MCNC: 176 MG/DL (ref 70–99)
HCT VFR BLD CALC: 43.2 % (ref 36–48)
HEMOGLOBIN: 14.6 G/DL (ref 12–16)
MCH RBC QN AUTO: 29.3 PG (ref 26–34)
MCHC RBC AUTO-ENTMCNC: 33.8 G/DL (ref 31–36)
MCV RBC AUTO: 86.7 FL (ref 80–100)
PDW BLD-RTO: 17.3 % (ref 12.4–15.4)
PLATELET # BLD: 201 K/UL (ref 135–450)
PMV BLD AUTO: 8.7 FL (ref 5–10.5)
POTASSIUM SERPL-SCNC: 3.8 MMOL/L (ref 3.5–5.1)
RBC # BLD: 4.98 M/UL (ref 4–5.2)
SODIUM BLD-SCNC: 140 MMOL/L (ref 136–145)
WBC # BLD: 9.3 K/UL (ref 4–11)

## 2019-06-20 PROCEDURE — 99239 HOSP IP/OBS DSCHRG MGMT >30: CPT | Performed by: NURSE PRACTITIONER

## 2019-06-20 PROCEDURE — 85027 COMPLETE CBC AUTOMATED: CPT

## 2019-06-20 PROCEDURE — 6370000000 HC RX 637 (ALT 250 FOR IP): Performed by: NURSE PRACTITIONER

## 2019-06-20 PROCEDURE — 2580000003 HC RX 258: Performed by: NURSE PRACTITIONER

## 2019-06-20 PROCEDURE — 80048 BASIC METABOLIC PNL TOTAL CA: CPT

## 2019-06-20 RX ORDER — PANTOPRAZOLE SODIUM 40 MG/1
40 TABLET, DELAYED RELEASE ORAL DAILY
Qty: 30 TABLET | Refills: 0 | Status: SHIPPED | OUTPATIENT
Start: 2019-06-20 | End: 2020-10-02

## 2019-06-20 RX ORDER — AMIODARONE HYDROCHLORIDE 200 MG/1
200 TABLET ORAL DAILY
Qty: 30 TABLET | Refills: 0
Start: 2019-06-20 | End: 2019-07-31 | Stop reason: DRUGHIGH

## 2019-06-20 RX ORDER — FUROSEMIDE 20 MG/1
20 TABLET ORAL DAILY PRN
Qty: 30 TABLET | Refills: 0 | Status: SHIPPED | OUTPATIENT
Start: 2019-06-20 | End: 2019-07-17 | Stop reason: SDUPTHER

## 2019-06-20 RX ORDER — TRAMADOL HYDROCHLORIDE 50 MG/1
50 TABLET ORAL EVERY 6 HOURS PRN
Qty: 30 TABLET | Refills: 0 | Status: SHIPPED | OUTPATIENT
Start: 2019-06-20 | End: 2019-06-27

## 2019-06-20 RX ORDER — CYCLOBENZAPRINE HCL 10 MG
10 TABLET ORAL 3 TIMES DAILY PRN
Qty: 20 TABLET | Refills: 0 | Status: SHIPPED | OUTPATIENT
Start: 2019-06-20 | End: 2019-06-27

## 2019-06-20 RX ADMIN — Medication 10 ML: at 08:49

## 2019-06-20 RX ADMIN — VITAMIN D, TAB 1000IU (100/BT) 2000 UNITS: 25 TAB at 08:48

## 2019-06-20 RX ADMIN — LEVOTHYROXINE SODIUM 112 MCG: 112 TABLET ORAL at 05:21

## 2019-06-20 RX ADMIN — METOPROLOL TARTRATE 50 MG: 50 TABLET, FILM COATED ORAL at 08:48

## 2019-06-20 RX ADMIN — PANTOPRAZOLE SODIUM 40 MG: 40 TABLET, DELAYED RELEASE ORAL at 08:47

## 2019-06-20 ASSESSMENT — PAIN SCALES - GENERAL
PAINLEVEL_OUTOF10: 0

## 2019-06-20 NOTE — PROGRESS NOTES
Data- discharge order received, pt verbalized agreement to discharge, disposition to previous residence, no needs for HHC/DME. Action- discharge instructions prepared and given to pt, pt verbalized understanding. Medication information packet given r/t NEW and/or CHANGED prescriptions emphasizing name/purpose/side effects, pt verbalized understanding. Discharge instruction summary: Diet- Cardiac, Activity- As tolerated - no lifting >10 lbs for 7 days, Primary Care Physician as follows: Karen Christian -[phone] f/u appointment to be scheduled by pt as needed, immunizations reviewed, paper prescription medications provided and medications e-scribed to pt's pharmacy of choice - pt aware. Inpatient surgical procedure precautions reviewed: Ablation procedure - no lifting >10 lbs for 7 days, limit stair climbing - post procedure precautions discussed and written educational material provided. Response- Pt belongings gathered, IV removed. Disposition is home (no HHC/DME needs), transported with son, taken to lobby via w/c w/ myself, no complications. Pt is ready and agreeable with discharge order. Pt denies further questions regarding discharge.

## 2019-06-20 NOTE — PROGRESS NOTES
Assisted patient with set-up of home [x]CPAP / []Bi-PAP unit without Oxygen bleed-in. [employer] contacted for equipment safety verification.

## 2019-06-20 NOTE — TELEPHONE ENCOUNTER
Denice Garcia asked for a work excuse for her son stating Minta Necessary needed to bring pt to surgery at 7:00am yesterday. This was supposed to be with her discharge papers. She just realized it wasn't. Once this is written, please call her son @ [phone] to let him know it is ready. He can get it tomorrow. Thank you.

## 2019-06-20 NOTE — PROGRESS NOTES
4 Eyes Skin Assessment     The patient is being assess for  Cath Lab Post-Op    I agree that 2 RN's have performed a thorough Head to Toe Skin Assessment on the patient. ALL assessment sites listed below have been assessed. Areas assessed by both nurses:   [x]   Head, Face, and Ears   [x]   Shoulders, Back, and Chest  [x]   Arms, Elbows, and Hands   [x]   Coccyx, Sacrum, and IschIum  [x]   Legs, Feet, and Heels        Does the Patient have Skin Breakdown?   No         Dayron Prevention initiated:  Yes   Wound Care Orders initiated:  No      [facility] nurse consulted for Pressure Injury (Stage 3,4, Unstageable, DTI, NWPT, and Complex wounds), New and Established Ostomies:  NA      Nurse 1 eSignature: Electronically signed by Janene Gilliland RN on 6/20/19 at 2:13 AM    **SHARE this note so that the co-signing nurse is able to place an eSignature**    Nurse 2 eSignature: Electronically signed by Kamar Hoover RN on 6/20/19 at 8:03 AM

## 2019-06-20 NOTE — PLAN OF CARE
Problem: Safety:  Goal: Free from accidental physical injury  Description  Free from accidental physical injury  Patient able to ambulate safely in room. Pt instructed to call with needs and states understanding. Problem: Bleeding:  Goal: Will show no signs and symptoms of excessive bleeding  Description  Will show no signs and symptoms of excessive bleeding  Right Groin site checked throughout shift. No changes in site noted. Will continue to observe for signs of bleeding. Problem: Pain:  Goal: Control of acute pain  Description  Control of acute pain  Patients pain level assessed with shift assessment and as needed throughout shift. Pt able to properly use a 0-10 pain scale and pt instructed to call out with any changes in pain level.

## 2019-06-20 NOTE — DISCHARGE SUMMARY
Alert & Oriented x4 in no distress  Skin:  Warm and dry, no cyanosis or bruising  Neck:  JVD<8, no bruit  Chest:  Clear to auscultation bilaterally, no wheezes/rhonchi/crackles  Cardiovascular:  RRR, S1/S2  Abdomen:  Soft, nontender, +bowel sounds  Extremities:  Trace BLE edema. Right groin ablation site soft, no hematoma/bruising/oozing noted. Mild tenderness to deep palpation and with abduction of the leg    Significant Diagnostic Studies:  Labs:   Lab Results   Component Value Date    CREATININE 0.9 2019    BUN 18 2019     2019    K 3.8 2019     2019    CO2 24 2019    Estimated Creatinine Clearance: 82 mL/min (based on SCr of 0.9 mg/dL). Lab Results   Component Value Date    WBC 9.3 2019    HGB 14.6 2019    HCT 43.2 2019    MCV 86.7 2019     2019      Lab Results   Component Value Date    INR 1.01 2019    PROTIME 11.5 2019    No results found for: BNP   Lab Results   Component Value Date    TSH 14.33 (H) 2018     LIPID PANEL:  Lab Results   Component Value Date    CHOL 150 11/10/2018    HDL 47 11/10/2018    TRIG 74 11/10/2018     ECHO: 2018  LVEF 60-65%,   LV wall motion is normal,   LA is moderately dilated,   the MV leaflets are moderately thickened in appearance.    The AV is not well visualized, the TV is not well visualized.      EK2019  Sinus bradycardiaBaseline artifactST & T wave abnormality, consider anterior ischemia  QTc 446    Disposition: home    Patient Instructions:      Carole Waldron   Home Medication Instructions [facility]:[id number]    Printed on:19 0945   Medication Information                      ALPRAZolam (XANAX) 0.25 MG tablet  Take 0.25 mg by mouth daily as needed              amiodarone (CORDARONE) 200 MG tablet  Take 1 tablet by mouth daily             Cholecalciferol (VITAMIN D) 2000 units CAPS capsule  Take 1 capsule by mouth daily             cyclobenzaprine (FLEXERIL) 10 MG tablet  Take 1 tablet by mouth 3 times daily as needed for Muscle spasms             furosemide (LASIX) 20 MG tablet  Take 1 tablet by mouth daily as needed (Swelling, SOB)             levothyroxine (SYNTHROID) 112 MCG tablet  Take 112 mcg by mouth Daily             metoprolol tartrate (LOPRESSOR) 50 MG tablet  TAKE 1 TABLET BY MOUTH TWICE A DAY             pantoprazole (PROTONIX) 40 MG tablet  Take 1 tablet by mouth daily             simvastatin (ZOCOR) 20 MG tablet  Take 1 tablet by mouth nightly             traMADol (ULTRAM) 50 MG tablet  Take 1 tablet by mouth every 6 hours as needed for Pain for up to 7 days. XARELTO 20 MG TABS tablet  TAKE 1 TABLET BY MOUTH DAILY               Recurrent Atrial Fibrillation  -S/P afib ablation 6/19/2019 and 12/15/2016  -Currently in sinus rhythm  -Continue metoprolol and amiodarone therapy  -Continue xarelto for [facility]  -[id number] score 3    HTN  -Controlled: Goal <140/90  -Continue current medications: metoprolol  -Discussed importance of low sodium diet, weight control and exercise    SUSIE  -Compliant with CPAP    1. Overall the patient is stable from CV standpoint  2. Most recent cardiac testing has been reviewed as above. Further testing is not required at this time. 3.The patient  currently  is not smoking. The risks related to smoking were reviewed with the patient. Recommend maintaining a smoke-free lifestyle. Products available for smoking cessation were discussed. Tobacco cessation counseling completed. 4.The patient is counseled to follow a low salt diet to assure blood pressure remains controlled for cardiovascular risk factor modification. 5.The patient is counseled to avoid excess caffeine, and energy drinks as this may exacerbated ectopy and arrhythmia. 6.The patient is counseled to get regular exercise 3-5 times per week to control cardiovascular risk factors.    7.The patient is counseled to lose weight to control cardiovascular risk factors. The patient is on a beta-blocker  The patient is not on an ace-i/ARB  The patient is on a statin and is not on antiplatelet therapy for CAD: No hx of CAD  The patient does have history of CHF and AF, and is on anticoagulation    Activity: activity as tolerated and no driving for today  Diet: cardiac diet  Wound Care: keep wound clean and dry    Follow-up with [address] in 4-6 weeks. Recommend follow up with PCP in 2-4 weeks.     Signed:  Leonie De La Cruz CNP, 6/20/2019, 9:57 AM    Total time spent on day of discharge including face-to-face visit, examination, documentation, counseling, preparation of discharge plans and followup, and discharge medicine reconciliation and presciptions is 40 minutes

## 2019-06-21 NOTE — ANESTHESIA POSTPROCEDURE EVALUATION
Department of Anesthesiology  Postprocedure Note    Patient: Jackelin Ayala  MRN: [id number]  YOB: 1960  Date of evaluation: 6/21/2019  Time:  8:50 AM     Procedure Summary     Date:  06/19/19 Room / Location:  [facility] Cath Lab; [facility] Echocardiography    Anesthesia Start:  4265 Anesthesia Stop:  1207    Procedure:  ECHO DEEPIKA IN CARDIAC CATH Diagnosis:       Unspecified atrial flutter      Paroxysmal atrial fibrillation      Persistent atrial fibrillation ([address]    Scheduled Providers:   Responsible Provider:  Matilda Vaughn MD    Anesthesia Type:  general ASA Status:  3          Anesthesia Type: general    Erich Phase I: Erich Score: 9    Erich Phase II:      Last vitals: Reviewed and per EMR flowsheets.        Anesthesia Post Evaluation    Level of consciousness: awake  Complications: no

## 2019-06-24 ENCOUNTER — TELEPHONE (OUTPATIENT)
Dept: CARDIOLOGY CLINIC | Age: 59
End: 2019-06-24

## 2019-06-24 NOTE — TELEPHONE ENCOUNTER
Achy in the chest similar to first ablation and some stomach bloating. Mild fatigue. She got gas x and wants to try that. No other cardiac related symptoms.

## 2019-06-27 ENCOUNTER — TELEPHONE (OUTPATIENT)
Dept: CARDIOLOGY CLINIC | Age: 59
End: 2019-06-27

## 2019-06-27 NOTE — TELEPHONE ENCOUNTER
Pt calling she is still having severe bloating in her upper stomach area. She tried gas-x and it isn't helping. Bowl moments are small because she isn't eating much. Please call to advise. Thank you.

## 2019-06-27 NOTE — TELEPHONE ENCOUNTER
Spoke to [place] about her abdominal distention 1 week post ablation. Her bowels are moving and she is passing gas, but unable to burp.   I instructed to follow up with her PCP

## 2019-07-10 ENCOUNTER — TELEPHONE (OUTPATIENT)
Dept: CARDIOLOGY CLINIC | Age: 59
End: 2019-07-10

## 2019-07-10 NOTE — TELEPHONE ENCOUNTER
Pt called and discussed situation. Pt states she started feeling very fatigued and \"just not right\" yesterday after helping to move a mattress. Upon checking her BP, she noticed her HR was 48-52 with a BP in the 130s. This morning she woke up with the same symptoms. Patient denies lightheadedness, dizziness, chest pain, palpitations, orthopnea, edema, presyncope or syncope. Right groin cath site stable per pt report, no swelling/bleeding/firmness. Instructed pt to check her BP/HR tonight and in the morning, then to hold her next 2 metoprolol doses as well. She will call me with those results tomorrow morning for review. May need to reduce her BB dosage. Instructed her to head to the ER if she develops extreme CP, SOB, dizziness, or syncope and she verbalized understanding. I will call her tomorrow for an update.     Darius Anand, APRN-CNP

## 2019-07-11 ENCOUNTER — TELEPHONE (OUTPATIENT)
Dept: CARDIOLOGY CLINIC | Age: 59
End: 2019-07-11

## 2019-07-11 NOTE — TELEPHONE ENCOUNTER
Pt states she was told to call NPSR with pulse. This morning it has been 62, 62, 60 59 and 60. States she is taking her metoprolol now. Please call and advise if she should take again tonight.  Please call pt to advise

## 2019-07-31 ENCOUNTER — OFFICE VISIT (OUTPATIENT)
Dept: CARDIOLOGY CLINIC | Age: 59
End: 2019-07-31
Payer: COMMERCIAL

## 2019-07-31 VITALS
HEIGHT: 63 IN | BODY MASS INDEX: 44.81 KG/M2 | WEIGHT: 252.9 LBS | SYSTOLIC BLOOD PRESSURE: 134 MMHG | HEART RATE: 60 BPM | DIASTOLIC BLOOD PRESSURE: 70 MMHG

## 2019-07-31 DIAGNOSIS — E78.2 MIXED HYPERLIPIDEMIA: ICD-10-CM

## 2019-07-31 DIAGNOSIS — I48.0 PAROXYSMAL ATRIAL FIBRILLATION (HCC): Primary | ICD-10-CM

## 2019-07-31 DIAGNOSIS — Q21.12 PFO (PATENT FORAMEN OVALE): ICD-10-CM

## 2019-07-31 DIAGNOSIS — I10 ESSENTIAL HYPERTENSION: ICD-10-CM

## 2019-07-31 PROCEDURE — 99214 OFFICE O/P EST MOD 30 MIN: CPT | Performed by: NURSE PRACTITIONER

## 2019-07-31 PROCEDURE — 93000 ELECTROCARDIOGRAM COMPLETE: CPT | Performed by: NURSE PRACTITIONER

## 2019-07-31 RX ORDER — AMIODARONE HYDROCHLORIDE 200 MG/1
100 TABLET ORAL DAILY
Qty: 30 TABLET | Refills: 0 | Status: SHIPPED
Start: 2019-07-31 | End: 2020-02-18 | Stop reason: ALTCHOICE

## 2019-07-31 NOTE — LETTER
[facility]  [address] [address]  Phone: [phone]  Fax: [phone]      July 31, 2019     Norah Evans [address] [address]    Patient: Gonzalez Matthews  MR Number: [id number]  YOB: 1960  Date of Visit: 7/31/2019    Dear Dr. Mazin Newman:    [address]     Outpatient Follow Up Addi Savage / HPI: Hospital Follow Up secondary to AF s/p [address] record has been reviewed  Hospital Course progressed as follows per discharge summary: Elective procedure  59year old  female with a history of aflutter/afib, HTN, SUSIE, and hyperlipidemia. First known episode of afib in 2010, which was symptomatic and treated with rythmol unsuccessfully. Yesterday, she was admitted for atrial fibrillation ablation on 6/19/2019 with Dr. Sedrick Gonzalez. The patient was seen and examined. Notes, labs, and recent testing reviewed. There were not complications over night. Up in unit walking without problems. Pt does complain of mild muscle soreness to right groin from a stretcher to bed transfer yesterday, symptoms improved with flexeril overnight. Interval hx: 7/10/19:   Called with c/o not feeling well, fatigued and pulse 48-52, some swelling and SOB. Metoprolol was held for 2 doses then resumed at 25 mg bid (decreased from 50 mg bid)     Gonzalez Matthews is 59 y.o. female who presents today for a routine follow up after a recent hospitalization related to the above mentioned issues. She recalls having times of feeling discomfort in her chest and sometimes her heart was racing. It left her feeling SOB. Subjective:   Since the time of discharge, the patient admits their symptoms have improved. She denies recurrence  Her pulse is her current concern (as noted above). She denies significant chest pain. There is some SOB climbing steps.  She

## 2019-09-05 PROBLEM — N18.30 BENIGN HYPERTENSION WITH CHRONIC KIDNEY DISEASE, STAGE III (HCC): Status: ACTIVE | Noted: 2019-09-05

## 2019-09-05 PROBLEM — I12.9 BENIGN HYPERTENSION WITH CHRONIC KIDNEY DISEASE, STAGE III (HCC): Status: ACTIVE | Noted: 2019-09-05

## 2019-09-30 ENCOUNTER — TELEPHONE (OUTPATIENT)
Dept: CARDIOLOGY CLINIC | Age: 59
End: 2019-09-30

## 2019-10-16 ENCOUNTER — OFFICE VISIT (OUTPATIENT)
Dept: CARDIOLOGY CLINIC | Age: 59
End: 2019-10-16
Payer: COMMERCIAL

## 2019-10-16 VITALS
HEIGHT: 63 IN | BODY MASS INDEX: 44.07 KG/M2 | HEART RATE: 58 BPM | SYSTOLIC BLOOD PRESSURE: 130 MMHG | DIASTOLIC BLOOD PRESSURE: 70 MMHG | WEIGHT: 248.7 LBS | OXYGEN SATURATION: 98 %

## 2019-10-16 DIAGNOSIS — I10 ESSENTIAL HYPERTENSION: ICD-10-CM

## 2019-10-16 DIAGNOSIS — I48.0 PAROXYSMAL ATRIAL FIBRILLATION (HCC): ICD-10-CM

## 2019-10-16 DIAGNOSIS — R07.89 OTHER CHEST PAIN: Primary | ICD-10-CM

## 2019-10-16 PROCEDURE — 99214 OFFICE O/P EST MOD 30 MIN: CPT | Performed by: NURSE PRACTITIONER

## 2019-10-16 RX ORDER — RIVAROXABAN 20 MG/1
TABLET, FILM COATED ORAL
Qty: 90 TABLET | Refills: 3 | Status: SHIPPED | OUTPATIENT
Start: 2019-10-16 | End: 2020-02-18 | Stop reason: ALTCHOICE

## 2019-12-13 RX ORDER — METOPROLOL TARTRATE 50 MG/1
TABLET, FILM COATED ORAL
Qty: 180 TABLET | Refills: 3 | Status: SHIPPED | OUTPATIENT
Start: 2019-12-13 | End: 2020-02-18 | Stop reason: SDUPTHER

## 2020-01-28 ENCOUNTER — PATIENT MESSAGE (OUTPATIENT)
Dept: CARDIOLOGY CLINIC | Age: 60
End: 2020-01-28

## 2020-01-28 ENCOUNTER — TELEPHONE (OUTPATIENT)
Dept: CARDIOLOGY CLINIC | Age: 60
End: 2020-01-28

## 2020-01-28 NOTE — TELEPHONE ENCOUNTER
She has an appt 2/18/20. She thinks at this next appt RMM might let her stop taking the xarelto and just take ASA. She ran out of xarelto and went to get it filled and it was going to cost $490. She just doesn't want to pay all that money then him tell her she doesn't need to take it anymore at her appt. Does he think she can just start taking the ASA now ?

## 2020-01-28 NOTE — TELEPHONE ENCOUNTER
I spoke with Ambar Moreira and offered samples to get her to the next visit on 2/18/20.   They are at the , she will pick them up today

## 2020-01-29 NOTE — TELEPHONE ENCOUNTER
From: Mimi Mendez  To: Gerald Grady MD  Sent: 1/28/2020 8:39 AM EST  Subject: Prescription Question    Good morning: I was refilling my xarelto and the price was just under $500 for the month supply. My question - I have an appointment coming up on Feb 18 - I have been in [address] rhythm since my ablation last June. You have let me stop Amiodarone a few months back and I have been fine. Would you consider allowing me to go off the xarelto and take a baby aspirin as long as I contact you immediately if I feel out of rhythm? Thanks for your time.     Josefina Saunders Birthday 1960

## 2020-02-18 ENCOUNTER — OFFICE VISIT (OUTPATIENT)
Dept: CARDIOLOGY CLINIC | Age: 60
End: 2020-02-18
Payer: COMMERCIAL

## 2020-02-18 VITALS
SYSTOLIC BLOOD PRESSURE: 117 MMHG | HEART RATE: 61 BPM | DIASTOLIC BLOOD PRESSURE: 71 MMHG | HEIGHT: 62 IN | WEIGHT: 253 LBS | BODY MASS INDEX: 46.56 KG/M2

## 2020-02-18 PROCEDURE — 99214 OFFICE O/P EST MOD 30 MIN: CPT | Performed by: INTERNAL MEDICINE

## 2020-02-18 PROCEDURE — 93000 ELECTROCARDIOGRAM COMPLETE: CPT | Performed by: INTERNAL MEDICINE

## 2020-02-18 NOTE — PROGRESS NOTES
used smokeless tobacco. She reports that she does not drink alcohol or use drugs. Family History:  family history includes Cancer in her maternal grandfather, paternal grandfather, and sister; Diabetes in her maternal grandmother and mother; Glaucoma in her father; Heart Disease in her mother; High Blood Pressure in her mother; High Cholesterol in her brother, mother, and sister; Stroke in her mother. Review of System:  General: negative for fever, chills  + weight gain   Ophthalmic ROS: negative for - eye pain or loss of vision  ENT ROS: negative for - headaches, sore throat   Respiratory: negative for - cough, sputum    Cardiovascular: Reviewed in HPI. Gastrointestinal: negative for - abdominal pain, diarrhea, N/V  Hematology: negative for - bleeding, blood clots, bruising or jaundice  Genito-Urinary:  negative for - Dysuria or incontinence  Musculoskeletal: negative for - Joint swelling, muscle pain  Neurological: negative for - confusion, dizziness, headaches   Psychiatric: No anxiety, no depression. Dermatological: negative for - rash    Vitals:    02/18/20 1542   BP: 117/71   Pulse: 61       Wt Readings from Last 3 Encounters:   02/18/20 253 lb (114.8 kg)   11/05/19 249 lb 3.2 oz (113 kg)   10/16/19 248 lb 11.2 oz (112.8 kg)     · Constitutional: Oriented. No distress. · Head: Normocephalic and atraumatic. · Mouth/Throat: Oropharynx is clear and moist.   · Eyes: Conjunctivae normal. EOM are normal.   · Neck: Neck supple. No JVD present. · Cardiovascular: Normal rate, regular rhythm, S1&S2. · Pulmonary/Chest: Bilateral respiratory sounds. No rhonchi. · Abdominal: Soft. No tenderness. · Musculoskeletal: No tenderness. No edema    · Lymphadenopathy: Has no cervical adenopathy. · Neurological: Alert and oriented. Follows command, No Gross deficit   · Skin: Skin is warm, No rash noted.    · Psychiatric: Has a normal behavior     Labs:  Lab Results   Component Value Date    TSHREFLEX 2.97 2017    TSH 14.33 2018    TSH 3.76 2017    CREATININE 0.94 2019    CREATININE 0.9 2019    AST 34 2019    ALT 52 2019     EC20  Sinus lizette rate 59  QTcH 421    Echo:  19  Summary   Normal left ventricle size, wall thickness, and systolic function with an   estimated ejection fraction of 55-60%. No regional wall motion abnormalities   are seen. Left atrial size appears dilated. 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 PFO with left to right shunt noted. Echo: 2018  LVEF 60-65%,   LV wall motion is normal,   LA is moderately dilated,   the MV leaflets are moderately thickened in appearance. The AV is not well visualized, the TV is not well visualized.      MCOT -2018  Shows some PAT/ PAF    Echo: 16  Overall left ventricular ejection fraction is estimated to be 60-65%. Left ventricular systolic function is normal. (LVEF>/=55%)  The left ventricular wall motion is normal.  The left atrium is moderately dilated. The mitral valve leaflets are moderately thickened in appearance. The aortic valve is not well visualized. The tricuspid valve is not well visualized. Medication:  Current Outpatient Medications   Medication Sig Dispense Refill    XARELTO 20 MG TABS tablet TAKE 1 TABLET BY MOUTH DAILY 90 tablet 3    levothyroxine (SYNTHROID) 137 MCG tablet Take 125 mcg by mouth Daily   3    simvastatin (ZOCOR) 20 MG tablet Take 1 tablet by mouth nightly 90 tablet 0    metoprolol tartrate (LOPRESSOR) 50 MG tablet TAKE 1 TABLET BY MOUTH TWICE A DAY (Patient taking differently: Take 25 mg by mouth 2 times daily ) 60 tablet 3    Cholecalciferol (VITAMIN D) 2000 units CAPS capsule Take 1 capsule by mouth daily      ALPRAZolam (XANAX) 0.25 MG tablet Take 0.25 mg by mouth daily as needed       pantoprazole (PROTONIX) 40 MG tablet Take 1 tablet by mouth daily 30 tablet 0     No current facility-administered medications for this visit. Patient Active Problem List    Diagnosis Date Noted    Benign hypertension with chronic kidney disease, stage III ([facility] 09/05/2019    Persistent atrial fibrillation 06/19/2019    Morbid obesity with BMI of 40.0-44.9, adult ([facility] 11/09/2017    Vitamin D deficiency 07/27/2017    Prediabetes 07/27/2017    Atrial flutter ([facility] 01/03/2017    Obesity due to excess calories     Essential hypertension 05/13/2011    SUSIE on CPAP 05/13/2011    Paroxysmal atrial fibrillation ([facility] 05/13/2011    Hyperlipidemia 05/13/2011        Assessment and plan:     - Paroxysmal atrial fibrillation/flutter     12/15/16 - A fib ablation with PVI, ablation of CVTI atrial flutter   The right superior vein has a very high ostium and separates from the roof. Also RIPV ostium was large too.      6/19/2019: PVI and roof line and inferior and posterior box and ablation of atypical atrial flutter. Remains in sinus rhythm. Low [id number] score. DC Xarelto    She will let us know if she has any irregular heartbeat or atrial fibrillation. Aggressive risk factor modifications recommended     HTN:     /71 (Site: Left Upper Arm, Position: Sitting, Cuff Size: Large Adult)   Pulse 61   Ht 5' 2\" (1.575 m)   Wt 253 lb (114.8 kg)   BMI 46.27 kg/m²     Controlled. Metoprolol 50 mg bid    - SUSIE:    uses CPAP. - Morbid Obesity: Body mass index is 46.27 kg/m². - Nathalie Obrien [facility] management and have lost weight. Stop Xarelto  Stay active  Weight reduction  Follow up with Chad Adam NP in 6 months and Dr. Fawn Hope in 1 year     Thank you for allowing me to participate in the care of Janel Fernández. Further evaluation will be based upon the patient's clinical course and testing results. All questions and concerns were addressed to the patient/family. Alternatives to my treatment were discussed. I have discussed the above stated plan and the patient verbalized understanding and agreed with the plan.     NOTE:

## 2020-07-27 PROBLEM — I48.19 PERSISTENT ATRIAL FIBRILLATION (HCC): Status: RESOLVED | Noted: 2019-06-19 | Resolved: 2020-07-27

## 2020-09-14 NOTE — PROGRESS NOTES
[facility]   Electrophysiology  RAZA Gonzalez-CNP  Attending EP: Dr. Fuentes Billy    Date: 10/2/2020  I had the privilege of visiting Steven Minor in the office. Chief Complaint:   Chief Complaint   Patient presents with    Follow-up    Atrial Fibrillation    Fatigue     Anemia, liver disease, kidney disease, been really tired the last week    Shortness of Breath     History of Present Illness: History obtained from patient and medical record. Steven Minor is 62 y.o. female with a past medical history of aflutter/afib, HTN, SUSIE, and hyperlipidemia. First known episode of afib in 2010, which was symptomatic and treated with rhythmol unsuccessfully. S/p RFCA with PVI, atypical atrial flutter (6/19/19, Dr. Fuentes Billy). -Interval history: Today, Steven Minor is being seen for follow up. She is doing pretty well. She remains tired, which she states is at her baseline. Pt is in sinus rhythm on EKG today. Her BP is stable, 123/89. Pt denies any known recurrence of her atrial fibrillation since her ablation last summer. She is compliant with her CPAP. She works as an  for a local high school. Pt admits that she has not been as active riding her stationary bike as home as she is undergoing a move from her current home. She had been up to riding 7 miles per day and felt better. Denies having chest pain, palpitations, shortness of breath, orthopnea/PND, cough, or dizziness at the time of this visit. With regard to medication therapy the patient has been compliant with prescribed regimen. They have tolerated therapy to date. Allergies: Allergies   Allergen Reactions    Vicodin [Hydrocodone-Acetaminophen] Nausea And Vomiting     Home Medications:  Prior to Visit Medications    Medication Sig Taking?  Authorizing Provider   metFORMIN (GLUCOPHAGE) 500 MG tablet TAKE 1 (ONE) TABLET DAILY FOR 1 WEEK THEN INCREASE TO TWICE DAILY Yes Historical Provider, MD   aspirin 81 MG EC tablet Take 81 mg by mouth daily Yes Historical Provider, MD   ferrous sulfate (IRON 325) 325 (65 Fe) MG tablet Take 325 mg by mouth daily (with breakfast) Yes Historical Provider, MD   levothyroxine (SYNTHROID) 137 MCG tablet Take 125 mcg by mouth Daily  Yes Historical Provider, MD   simvastatin (ZOCOR) 20 MG tablet Take 1 tablet by mouth nightly Yes Leonardo Soto MD   metoprolol tartrate (LOPRESSOR) 50 MG tablet TAKE 1 TABLET BY MOUTH TWICE A DAY  Patient taking differently: Take 25 mg by mouth 2 times daily  Yes Coral Cameron MD   Cholecalciferol (VITAMIN D) 2000 units CAPS capsule Take 1 capsule by mouth daily Yes Historical Provider, MD   ALPRAZolam (XANAX) 0.25 MG tablet Take 0.25 mg by mouth daily as needed  Yes Historical Provider, MD   pantoprazole (PROTONIX) 40 MG tablet Take 1 tablet by mouth daily  Pavan Davis, APRN - CNP      Past Medical History:  Past Medical History:   Diagnosis Date    A-fib ([facility]     Anxiety     CHF (congestive heart failure) ([facility]     COPD (chronic obstructive pulmonary disease) ([facility]     Depression     Diabetes mellitus ([facility]     Heart palpitations     Hyperlipidemia     Hypertension     Sleep apnea     Urinary incontinence      Past Surgical History:    has a past surgical history that includes Carpal tunnel release; cyst removal; Atrial ablation surgery; thoracotomy; and Breast biopsy. Social History:  Reviewed. reports that she quit smoking about 10 years ago. She has never used smokeless tobacco. She reports that she does not drink alcohol or use drugs. Family History:  Reviewed. family history includes Cancer in her maternal grandfather, paternal grandfather, and sister; Diabetes in her maternal grandmother and mother; Glaucoma in her father; Heart Disease in her mother; High Blood Pressure in her mother; High Cholesterol in her brother, mother, and sister; Stroke in her mother.      Review of System:  · Constitutional: Negative for fever, night sweats, chills, weight changes, or weakness  · Skin: Negative for rash, dry skin, pruritus, bruising, bleeding, blood clots, or changes in skin pigment  · HEENT: Negative for vision changes, ringing in the ears, sore throat, dysphagia, or swollen lymph nodes  · Respiratory: Reviewed in HPI  · Cardiovascular: Reviewed in HPI  · Gastrointestinal: Negative for abdominal pain, N/V/D, constipation, or black/tarry stools  · Genito-Urinary: Negative for dysuria, incontinence, urgency, or hematuria  · Musculoskeletal: Negative for joint swelling, muscle pain, or injuries  · Neurological/Psych: Negative for confusion, seizures, dizziness, headaches, balance issues or TIA-like symptoms. No anxiety, depression, or insomnia    Physical Examination:  Vitals:    10/02/20 1449   BP: 123/89   Pulse: 65      Wt Readings from Last 3 Encounters:   10/02/20 250 lb (113.4 kg)   02/18/20 253 lb (114.8 kg)   11/05/19 249 lb 3.2 oz (113 kg)     Constitutional: Cooperative and in no apparent distress, and appears well nourished  Skin: Warm and pink; no pallor, cyanosis, bruising, or clubbing  HEENT: Symmetric and normocephalic. PERRL, EOM intact. Conjunctiva pink with clear sclera. Mucus membranes pink and moist. Teeth intact. Thyroid smooth without nodules or goiter  Respiratory: Respirations symmetric and unlabored. Lungs clear to auscultation bilaterally, no wheezing, rhonchi, or crackles  Cardiovascular:  Regular rate and rhythm. S1/S2 present without murmurs, rubs, or gallops. Peripheral pulses 2+, capillary refill < 3 seconds. No elevation of JVP. No peripheral edema  Gastrointestinal: Abdomen soft and round. Bowel sounds normoactive in all quadrants without tenderness or masses. + Obese  Musculoskeletal: Bilateral upper and lower extremity strength 5/5 with full ROM. Neurological/Psych: Awake and orientated to person, place and time. Calm affect, appropriate mood.      Pertinent labs, diagnostic, device, and imaging results reviewed as a part of this visit    LABS    CBC:   Lab Results   Component Value Date    WBC 4.4 07/23/2020    HGB 13.4 07/23/2020    HCT 40.0 07/23/2020    MCV 76.7 (L) 07/23/2020     07/23/2020     BMP:   Lab Results   Component Value Date    CREATININE 0.91 07/23/2020    BUN 14 07/23/2020     07/23/2020    K 4.2 07/23/2020     07/23/2020    CO2 25 07/23/2020     Estimated Creatinine Clearance: 79 mL/min (based on SCr of 0.91 mg/dL). Thyroid:   Lab Results   Component Value Date    TSH 0.452 07/23/2020     Lipid Panel:   Lab Results   Component Value Date    CHOL 126 07/23/2020    HDL 50 07/23/2020    TRIG 93 07/23/2020     LFTs:  Lab Results   Component Value Date    ALT 26 07/23/2020    AST 18 07/23/2020    ALKPHOS 80 07/23/2020    BILITOT 0.4 07/23/2020     Coags:   Lab Results   Component Value Date    PROTIME 11.5 06/19/2019    INR 1.01 06/19/2019       ECG: 10/2/2020  - NSR, rate 66, QTc 420    DEEPIKA: 6/19   Normal left ventricle size, wall thickness, and systolic function with an   estimated ejection fraction of 55-60%. No regional wall motion abnormalities   are seen. Left atrial size appears dilated. 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 PFO with left to right shunt noted. Echo: 5/18 ([facility])  EF 60-65%. LA moderately dilated. GXT: 10/14  Normal myocardial perfusion study.     Normal LV function. Assessment:    1. Paroxysmal Atrial Fibrillation/flutter  - S/p RFCA with PVI, CVTI atrial flutter (12/15/16)  - S/p RFCA with PVI, roof line, inferior/posterior box, ablation of atypical atrial flutter (6/19/19)    - Currently in NSR  - Continue metoprolol 25 mg BID for now   ~ Consider reducing dose or trial off to see if fatigue improves, pt declined for now    - [id number] score: 1 (HTN) ; [id number] Vasc score and anticoagulation discussed. High risk for stroke and thromboembolism. Anticoagulation is recommended. Risk of bleeding was discussed.   ~ Off anti-coagulation s/p ablation; previously on Xarelto  ~ Continue ASA daily    - Afib risk factors including age, HTN, obesity, inactivity and SUSIE were discussed with patient. Risk factor modification recommended      - Treatment options including cardioversion, rate control strategy, antiarrhythmics, anticoagulation and possible ablation were discussed with patient. Risks, benefits and alternative of each treatment options were explained. All questions answered    2. HTN  - Controlled: Goal <130/80  - Continue current medications  - Encouraged to monitor and log BP readings at home, then bring log to next visit  - Discussed importance of low sodium diet, weight control and exercise    3. SUSIE  - Stable: Uses CPAP  - Encourage to use CPAP to prevent long term effects of untreated SUSIE    4. Anemia   - Stable    ~ H/H 13.4/40 (7/20)   - On PO iron daily    5. Hypothyroidism   - Stable    ~ TSH 0.45 (7/20)   - On synthroid    6. Obesity  - Body mass index is 44.29 kg/m². - Complicating assessment and treatment. Placing patient at risk for multiple co-morbidities as well as early death and contributing to the patient's presentation  - Discussed weight loss and patient was encouraged to reduce calorie intake and increase exercise    Plan:  1. Continue current medications for now  2. Call office if you decide you want to stop the metoprolol  3. Exercise as tolerated    F/U: Follow-up with EP in 6 months  -Call [facility] at [phone] with any questions    Diet & Exercise:   The patient is counseled to follow a low salt diet to assure blood pressure remains controlled for cardiovascular risk factor modification   The patient is counseled to avoid excess caffeine, and energy drinks as this may exacerbated ectopy and arrhythmia   The patient is counseled to lose weight to control cardiovascular risk factors   Exercise program discussed:  To improve overall cardiovascular health, the patient is instructed to increase cardiovascular related activities with a goal of 150 min/week of moderate level activity or 10,000 steps per day. Encouraged to perform as much activity as tolerated    Quality Metrics  1. Tobacco Cessation Counseling: N/A.   2.   Retake of BP if >140/90: N/A   3. Documentation to PCP: Note sent to PCP office visit  4. CAD patient on anti-platelet: N/A   5.   CAD patient on STATIN therapy: N/A   6. Patient with history of CHF and atrial fibrillation on anticoagulation: No, low risk      I have addressed the patient's cardiac risk factors and adjusted pharmacologic treatment as needed. In addition, I have reinforced the need for patient directed risk factor modification. I independently reviewed the ECG    All questions and concerns were addressed with the patient. Alternatives to treatment were discussed. Thank you for allowing to us to participate in the care of Becki Johnson.     ZORAN Pickard  [facility]   Office: [phone]

## 2020-10-02 ENCOUNTER — OFFICE VISIT (OUTPATIENT)
Dept: CARDIOLOGY CLINIC | Age: 60
End: 2020-10-02
Payer: COMMERCIAL

## 2020-10-02 VITALS
WEIGHT: 250 LBS | DIASTOLIC BLOOD PRESSURE: 89 MMHG | SYSTOLIC BLOOD PRESSURE: 123 MMHG | HEART RATE: 65 BPM | HEIGHT: 63 IN | BODY MASS INDEX: 44.3 KG/M2

## 2020-10-02 PROBLEM — N18.30 BENIGN HYPERTENSION WITH CHRONIC KIDNEY DISEASE, STAGE III (HCC): Status: RESOLVED | Noted: 2019-09-05 | Resolved: 2020-10-02

## 2020-10-02 PROBLEM — I12.9 BENIGN HYPERTENSION WITH CHRONIC KIDNEY DISEASE, STAGE III (HCC): Status: RESOLVED | Noted: 2019-09-05 | Resolved: 2020-10-02

## 2020-10-02 PROCEDURE — 99214 OFFICE O/P EST MOD 30 MIN: CPT | Performed by: NURSE PRACTITIONER

## 2020-10-02 PROCEDURE — 93000 ELECTROCARDIOGRAM COMPLETE: CPT | Performed by: NURSE PRACTITIONER

## 2020-10-02 RX ORDER — ASPIRIN 81 MG/1
81 TABLET ORAL DAILY
COMMUNITY

## 2020-10-02 RX ORDER — FERROUS SULFATE 325(65) MG
325 TABLET ORAL
COMMUNITY
End: 2021-05-11

## 2020-10-06 ENCOUNTER — TELEPHONE (OUTPATIENT)
Dept: CARDIOLOGY CLINIC | Age: 60
End: 2020-10-06

## 2020-10-06 NOTE — TELEPHONE ENCOUNTER
Medication Refill    Medication needing refilled:metoprolol     Dosage of the medication:25mg   (not the 50mg)    How are you taking this medication (QD, BID, TID, QID, PRN):BID     30 or 90 day supply called in:    Which Pharmacy are we sending the medication to?:   [facility] [place]

## 2021-01-29 NOTE — PROGRESS NOTES
[address]   Electrophysiology Follow up   Date: 1/29/2021  I had the privilege of visiting Cindy Washington in the office. CC: Afib/flutter  HPI: Cindy Washington is a 56 y.o. female with a history of atrial flutter, AF, HTN and hyperlipidemia. Atrial fib first started about around 2010, and she was symptomatic with it. Treated with Rythmol but continued having symptoms. On 12/15/16 - s/p ablation of Atrial fib with PVI, ablation of CVTI atrial flutter. 48 hr holter monitor 4/10/17 > SR with avg HR 61 (). Antiarrhythmic therapy Amiodarone was discontinued last visit in July. She was started on propafenone 225mg daily at [address] 4/9/2018 for recurrent atrial fibrillation. This was not effective and she was restarted back on amiodarone. Since last OV she was supposed to have DCCV the week of 4/16/18 but was in NSR when she arrived for procedure. On 4/17/18 she was scheduled for stress testing and was found to be in atrial fib. She restarted amiodarone on 5/1/2018 6/19/19 EP study with RF ablation of atrial fib, additional ablation of atypical atrial flutter     Post discharge she had symptomatic bradycardia with heart rates 40-50. Her Metoprolol dose was reduced. Xarelto was discontinued at her last visit with me. Today she denies chest pain, shortness of breath, palpitations, or dizziness. \"feels good. \" she has lost 5 lbs in the last few months. Does admit to some leg swelling, but she sees improvement with decreased salt intake. Assessment and plan:     - Paroxysmal atrial fibrillation/flutter                 12/15/16 - A fib ablation with PVI, ablation of CVTI atrial flutter              The right superior vein has a very high ostium and separates from the roof. Also RIPV ostium was large too.                            6/19/2019: PVI and roof line and inferior and posterior box and ablation of atypical atrial flutter. Low [id number] score. Off anti-coagulation s/p ablation; previously on Xarelto. Aggressive risk factor modifications recommended   Stay active, weight reduction    Discussed in detail about the risk factors, pathophysiology, and treatment options including life style modifications for atrial fibrillation. ? Eat heart healthy diet  ? Minimize alcohol intake  ? Minimize caffeine and soda   ? Keep your blood pressure under control. ? Keep your blood sugar under control. ? Be active, keep your body weight optimal  ? Exercise on a regular basis  ? Manage your stress   ? If you snore, get evaluated for sleep apnea  ? Be aware of the symptoms of stroke                  HTN:                Controlled today. Goal <130/80. Encouraged home checks. Metoprolol 25 mg bid   Reduce salt intake     - SUSIE:               Compliant with BiPAP. - Morbid Obesity: Body mass index is 46.27 kg/m². [address] [facility] management and have lost weight. Patient Active Problem List    Diagnosis Date Noted    Morbid obesity with BMI of 40.0-44.9, adult ([facility] 11/09/2017    Vitamin D deficiency 07/27/2017    Prediabetes 07/27/2017    Atrial flutter ([facility] 01/03/2017    Essential hypertension 05/13/2011    SUSIE on CPAP 05/13/2011    Paroxysmal atrial fibrillation ([facility] 05/13/2011    Hyperlipidemia 05/13/2011     Diagnostic studies:     ECG 2/5/21   SR at 63 bpm  QTcH 443     DEEPIKA:  6/19/19  Summary   Normal left ventricle size, wall thickness, and systolic function with an   estimated ejection fraction of 55-60%. No regional wall motion abnormalities   are seen. Left atrial size appears dilated. 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 PFO with left to right shunt noted. Echo: 5/1/2018 ([facility])  LVEF 60-65%,   LV wall motion is normal,   LA is moderately dilated,   the MV leaflets are moderately thickened in appearance. The AV is not well visualized, the TV is not well visualized. MCOT 5/9-6/7/2018  Shows some PAT/ PAF     Echo: 12/17/16  Overall left ventricular ejection fraction is estimated to be 60-65%. Left ventricular systolic function is normal. (LVEF>/=55%)  The left ventricular wall motion is normal.  The left atrium is moderately dilated. The mitral valve leaflets are moderately thickened in appearance. The aortic valve is not well visualized. The tricuspid valve is not well visualized. SPECT: 10/1/14  Normal myocardial perfusion study. Normal LV function. I independently reviewed the cardiac diagnostic studies, ECG and relevant imaging studies. Lab Results   Component Value Date    LVEF 58 06/19/2019    LVEFMODE Echo 12/17/2016     Lab Results   Component Value Date    TSH 0.452 07/23/2020         Physical Examination:  There were no vitals filed for this visit. Wt Readings from Last 3 Encounters:   10/02/20 250 lb (113.4 kg)   02/18/20 253 lb (114.8 kg)   11/05/19 249 lb 3.2 oz (113 kg)       · Constitutional: Oriented. No distress. · Head: Normocephalic and atraumatic. · Mouth/Throat: Oropharynx is clear and moist.   · Eyes: Conjunctivae normal. EOM are normal.   · Neck: Neck supple. No JVD present. · Cardiovascular: Normal rate, regular rhythm, S1&S2. · Pulmonary/Chest: Bilateral respiratory sounds. No rhonchi. · Abdominal: Soft. No tenderness. · Musculoskeletal: No tenderness. No edema    · Lymphadenopathy: Has no cervical adenopathy. · Neurological: Alert and oriented. Follows command, No Gross deficit   · Skin: Skin is warm, No rash noted. · Psychiatric: Has a normal behavior     Review of System:  [x] Full ROS obtained and negative except as mentioned in HPI    Prior to Admission medications    Medication Sig Start Date End Date Taking?  Authorizing Provider   metoprolol tartrate (LOPRESSOR) 25 MG tablet Take 1 tablet by mouth 2 times daily 10/6/20   RAZA Solares - CNP metFORMIN (GLUCOPHAGE) 500 MG tablet TAKE 1 (ONE) TABLET DAILY FOR 1 WEEK THEN INCREASE TO TWICE DAILY 7/27/20   Historical Provider, MD   aspirin 81 MG EC tablet Take 81 mg by mouth daily    Historical Provider, MD   ferrous sulfate (IRON 325) 325 (65 Fe) MG tablet Take 325 mg by mouth daily (with breakfast)    Historical Provider, MD   levothyroxine (SYNTHROID) 137 MCG tablet Take 125 mcg by mouth Daily  8/9/19   Historical Provider, MD   simvastatin (ZOCOR) 20 MG tablet Take 1 tablet by mouth nightly 5/1/18   Coco Boston MD   Cholecalciferol (VITAMIN D) 2000 units CAPS capsule Take 1 capsule by mouth daily    Historical Provider, MD   ALPRAZolam Jorge Stager) 0.25 MG tablet Take 0.25 mg by mouth daily as needed     Historical Provider, MD       Allergies   Allergen Reactions    Vicodin [Hydrocodone-Acetaminophen] Nausea And Vomiting       Social History:  Reviewed. reports that she quit smoking about 10 years ago. She has never used smokeless tobacco. She reports that she does not drink alcohol or use drugs. Family History:  Reviewed. Reviewed. No family history of SCD. Relevant and available labs, and cardiovascular diagnostics reviewed. Reviewed. I independently reviewed relevant and available cardiac diagnostic tests ECG, CXR, Echo, Stress test, Device interrogation, Holter, CT scan. Complex medical condition with multiple medical problems affecting prognosis and outcome of EP interventions    All questions and concerns were addressed to the patient/family. Alternatives to my treatment were discussed. I have discussed the above stated plan and the patient verbalized understanding and agreed with the plan. Physician Attestation: I, Dr. Rosalie Millan, confirm that the scribe's documentation has been prepared under my direction and personally reviewed by me in its entirety. I also confirm that the note above accurately reflects all work, treatment, procedures, and medical decision making performed by me. NOTE: This report was transcribed using voice recognition software. Every effort was made to ensure accuracy, however, inadvertent computerized transcription errors may be present. Rosalie Millan MD, MPH  [facility]   Office: [phone]  Fax: [phone]: This note was scribed in the presence of Dr. Rosalie Millan MD by Fabienne Richard RN.

## 2021-02-05 ENCOUNTER — OFFICE VISIT (OUTPATIENT)
Dept: CARDIOLOGY CLINIC | Age: 61
End: 2021-02-05
Payer: COMMERCIAL

## 2021-02-05 VITALS
HEART RATE: 66 BPM | SYSTOLIC BLOOD PRESSURE: 133 MMHG | HEIGHT: 63 IN | DIASTOLIC BLOOD PRESSURE: 87 MMHG | WEIGHT: 245.4 LBS | BODY MASS INDEX: 43.48 KG/M2 | OXYGEN SATURATION: 97 %

## 2021-02-05 DIAGNOSIS — I48.4 ATYPICAL ATRIAL FLUTTER (HCC): Primary | ICD-10-CM

## 2021-02-05 DIAGNOSIS — I48.0 PAROXYSMAL ATRIAL FIBRILLATION (HCC): Chronic | ICD-10-CM

## 2021-02-05 DIAGNOSIS — I10 ESSENTIAL HYPERTENSION: Chronic | ICD-10-CM

## 2021-02-05 DIAGNOSIS — E78.2 MIXED HYPERLIPIDEMIA: Chronic | ICD-10-CM

## 2021-02-05 PROCEDURE — 93000 ELECTROCARDIOGRAM COMPLETE: CPT | Performed by: INTERNAL MEDICINE

## 2021-02-05 PROCEDURE — 99214 OFFICE O/P EST MOD 30 MIN: CPT | Performed by: INTERNAL MEDICINE

## 2021-02-05 NOTE — LETTER
[facility] Cardiology [facility]  [address] [facility] [address] [address]  Phone: [phone]  Fax: [phone]    Jayy Pope MD        February 5, 2021     Norah [address] [address] [place] [address]    Patient: Rohan Quintero  MR Number: [id number]  YOB: 1960  Date of Visit: 2/5/2021    Dear Dr. Scott Downs:    Devora [address]   Electrophysiology Follow up   Date: 1/29/2021  I had the privilege of visiting Rohan Quintero in the office. CC: Afib/flutter  HPI: Rohan Quintero is a 56 y.o. female with a history of atrial flutter, AF, HTN and hyperlipidemia. Atrial fib first started about around 2010, and she was symptomatic with it. Treated with Rythmol but continued having symptoms. On 12/15/16 - s/p ablation of Atrial fib with PVI, ablation of CVTI atrial flutter. 48 hr holter monitor 4/10/17 > SR with avg HR 61 (). Antiarrhythmic therapy Amiodarone was discontinued last visit in July. She was started on propafenone 225mg daily at [address] 4/9/2018 for recurrent atrial fibrillation. This was not effective and she was restarted back on amiodarone. Since last OV she was supposed to have DCCV the week of 4/16/18 but was in NSR when she arrived for procedure. On 4/17/18 she was scheduled for stress testing and was found to be in atrial fib. She restarted amiodarone on 5/1/2018 6/19/19 EP study with RF ablation of atrial fib, additional ablation of atypical atrial flutter     Post discharge she had symptomatic bradycardia with heart rates 40-50. Her Metoprolol dose was reduced. Xarelto was discontinued at her last visit with me. Today she denies chest pain, shortness of breath, palpitations, or dizziness. \"feels good. \" she has lost 5 lbs in the last few months. Does admit to some leg swelling, but she sees improvement with decreased salt intake.      Assessment and plan:     - Paroxysmal atrial fibrillation/flutter 12/15/16 - A fib ablation with PVI, ablation of CVTI atrial flutter              The right superior vein has a very high ostium and separates from the roof. Also RIPV ostium was large too.                            6/19/2019: PVI and roof line and inferior and posterior box and ablation of atypical atrial flutter. Low [id number] score. Off anti-coagulation s/p ablation; previously on Xarelto. Aggressive risk factor modifications recommended   Stay active, weight reduction    Discussed in detail about the risk factors, pathophysiology, and treatment options including life style modifications for atrial fibrillation. ? Eat heart healthy diet  ? Minimize alcohol intake  ? Minimize caffeine and soda   ? Keep your blood pressure under control. ? Keep your blood sugar under control. ? Be active, keep your body weight optimal  ? Exercise on a regular basis  ? Manage your stress   ? If you snore, get evaluated for sleep apnea  ? Be aware of the symptoms of stroke                  HTN:                Controlled today. Goal <130/80. Encouraged home checks. Metoprolol 25 mg bid   Reduce salt intake     - SUSIE:               Compliant with BiPAP. - Morbid Obesity: Body mass index is 46.27 kg/m². Nathalie [address] [facility] management and have lost weight.         Patient Active Problem List    Diagnosis Date Noted    Morbid obesity with BMI of 40.0-44.9, adult ([facility] 11/09/2017    Vitamin D deficiency 07/27/2017    Prediabetes 07/27/2017    [facility] 01/03/2017    Essential hypertension 05/13/2011    SUSIE on CPAP 05/13/2011    Paroxysmal atrial fibrillation ([facility] 05/13/2011    Hyperlipidemia 05/13/2011     Diagnostic studies:     ECG 2/5/21   SR at 63 bpm  QTcH 443     DEEPIKA:  6/19/19  Summary   Normal left ventricle size, wall thickness, and systolic function with an estimated ejection fraction of 55-60%. No regional wall motion abnormalities   are seen. Left atrial size appears dilated. 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 PFO with left to right shunt noted. Echo: 5/1/2018 ([facility])  LVEF 60-65%,   LV wall motion is normal,   LA is moderately dilated,   the MV leaflets are moderately thickened in appearance. The AV is not well visualized, the TV is not well visualized. MCOT 5/9-6/7/2018  Shows some PAT/ PAF     Echo: 12/17/16  Overall left ventricular ejection fraction is estimated to be 60-65%. Left ventricular systolic function is normal. (LVEF>/=55%)  The left ventricular wall motion is normal.  The left atrium is moderately dilated. The mitral valve leaflets are moderately thickened in appearance. The aortic valve is not well visualized. The tricuspid valve is not well visualized. SPECT: 10/1/14  Normal myocardial perfusion study. Normal LV function. I independently reviewed the cardiac diagnostic studies, ECG and relevant imaging studies. Lab Results   Component Value Date    LVEF 58 06/19/2019    LVEFMODE Echo 12/17/2016     Lab Results   Component Value Date    TSH 0.452 07/23/2020         Physical Examination:  There were no vitals filed for this visit. Wt Readings from Last 3 Encounters:   10/02/20 250 lb (113.4 kg)   02/18/20 253 lb (114.8 kg)   11/05/19 249 lb 3.2 oz (113 kg)       · Constitutional: Oriented. No distress. · Head: Normocephalic and atraumatic. · Mouth/Throat: Oropharynx is clear and moist.   · Eyes: Conjunctivae normal. EOM are normal.   · Neck: Neck supple. No JVD present. · Cardiovascular: Normal rate, regular rhythm, S1&S2. · Pulmonary/Chest: Bilateral respiratory sounds. No rhonchi. · Abdominal: Soft. No tenderness. · Musculoskeletal: No tenderness. No edema    · Lymphadenopathy: Has no cervical adenopathy. · Neurological: Alert and oriented.  Follows command, No Gross deficit · Skin: Skin is warm, No rash noted. · Psychiatric: Has a normal behavior     Review of System:  [x] Full ROS obtained and negative except as mentioned in HPI    Prior to Admission medications    Medication Sig Start Date End Date Taking? Authorizing Provider   metoprolol tartrate (LOPRESSOR) 25 MG tablet Take 1 tablet by mouth 2 times daily 10/6/20   Tj Orellana, APRN - CNP   metFORMIN (GLUCOPHAGE) 500 MG tablet TAKE 1 (ONE) TABLET DAILY FOR 1 WEEK THEN INCREASE TO TWICE DAILY 7/27/20   Historical Provider, MD   aspirin 81 MG EC tablet Take 81 mg by mouth daily    Historical Provider, MD   ferrous sulfate (IRON 325) 325 (65 Fe) MG tablet Take 325 mg by mouth daily (with breakfast)    Historical Provider, MD   levothyroxine (SYNTHROID) 137 MCG tablet Take 125 mcg by mouth Daily  8/9/19   Historical Provider, MD   simvastatin (ZOCOR) 20 MG tablet Take 1 tablet by mouth nightly 5/1/18   Jason Calles MD   Cholecalciferol (VITAMIN D) 2000 units CAPS capsule Take 1 capsule by mouth daily    Historical Provider, MD   ALPRAZolam Sandra Dubonnet) 0.25 MG tablet Take 0.25 mg by mouth daily as needed     Historical Provider, MD       Allergies   Allergen Reactions    Vicodin [Hydrocodone-Acetaminophen] Nausea And Vomiting       Social History:  Reviewed. reports that she quit smoking about 10 years ago. She has never used smokeless tobacco. She reports that she does not drink alcohol or use drugs. Family History:  Reviewed. Reviewed. No family history of SCD. Relevant and available labs, and cardiovascular diagnostics reviewed. Reviewed. I independently reviewed relevant and available cardiac diagnostic tests ECG, CXR, Echo, Stress test, Device interrogation, Holter, CT scan.    Complex medical condition with multiple medical problems affecting prognosis and outcome of EP interventions All questions and concerns were addressed to the patient/family. Alternatives to my treatment were discussed. I have discussed the above stated plan and the patient verbalized understanding and agreed with the plan. Physician Attestation: I, Dr. Juliet Quijano, confirm that the scribe's documentation has been prepared under my direction and personally reviewed by me in its entirety. I also confirm that the note above accurately reflects all work, treatment, procedures, and medical decision making performed by me. NOTE: This report was transcribed using voice recognition software. Every effort was made to ensure accuracy, however, inadvertent computerized transcription errors may be present. Juliet Quijano MD, MPH  [facility]   Office: [phone]  Fax: [phone]: This note was scribed in the presence of Dr. Juliet Quijano MD by Florinda Posey RN.           Sincerely,        Juliet Quijano MD

## 2021-03-26 ENCOUNTER — TELEPHONE (OUTPATIENT)
Dept: CARDIOLOGY CLINIC | Age: 61
End: 2021-03-26

## 2021-03-26 ENCOUNTER — NURSE ONLY (OUTPATIENT)
Dept: CARDIOLOGY CLINIC | Age: 61
End: 2021-03-26
Payer: COMMERCIAL

## 2021-03-26 DIAGNOSIS — I48.0 PAROXYSMAL ATRIAL FIBRILLATION (HCC): Primary | ICD-10-CM

## 2021-03-26 PROCEDURE — 93000 ELECTROCARDIOGRAM COMPLETE: CPT | Performed by: INTERNAL MEDICINE

## 2021-03-26 PROCEDURE — 93228 REMOTE 30 DAY ECG REV/REPORT: CPT | Performed by: INTERNAL MEDICINE

## 2021-03-26 NOTE — TELEPHONE ENCOUNTER
Called and spoke with the patient and informed her of message below.  Call transferred to Allie Hong for scheduling

## 2021-04-13 NOTE — PROGRESS NOTES
[facility]   Electrophysiology  ZORAN Calvo  Attending EP: Dr. Nasreen Feliciano    Date: 5/11/2021  I had the privilege of visiting Kermit Lyons in the office. Chief Complaint:   Chief Complaint   Patient presents with    6 Month Follow-Up     History of Present Illness: History obtained from patient and medical record. Kermit Lyons is 56 y.o. female with a past medical history of aflutter/afib, HTN, SUSIE, and hyperlipidemia. First known episode of afib in 2010, which was symptomatic and treated with rhythmol unsuccessfully. S/p RFCA with PVI, atypical atrial flutter (6/19/19, Dr. Nasreen Feliciano). -Interval history: Today, Kermit Lyons is being seen for follow up. She is doing pretty well. She recently wore an event monitor due to palpitations. Pt had a few episodes with symptomatic PACs on the monitor. She states they have settled down since finishing the monitor. She states she is very stressed out with her life and job. She works as an  at [employer]. She has been seen by a nutritionist and weight loss , but has not had great success in her weight loss efforts over the last year. Pt reports compliance with her CPAP at night. Denies having chest pain, palpitations, shortness of breath, orthopnea/PND, cough, or dizziness at the time of this visit. With regard to medication therapy the patient has been compliant with prescribed regimen. They have tolerated therapy to date. Allergies: Allergies   Allergen Reactions    Vicodin [Hydrocodone-Acetaminophen] Nausea And Vomiting     Home Medications:  Prior to Visit Medications    Medication Sig Taking?  Authorizing Provider   levothyroxine (SYNTHROID) 125 MCG tablet TAKE 1 TABLET BY MOUTH DAILY, NEXT ANNUAL WELLNESS CHECKUP MAY 2021 Yes Historical Provider, MD   simvastatin (ZOCOR) 20 MG tablet Take 2 tablets by mouth nightly Yes RAZA Calvo CNP   metoprolol tartrate (LOPRESSOR) 25 MG tablet TAKE 1 TABLET BY Shweta Rivera MD   metFORMIN (GLUCOPHAGE) 500 MG tablet Take 500 mg by mouth 2 times daily (with meals)  Yes Historical Provider, MD   aspirin 81 MG EC tablet Take 81 mg by mouth daily Yes Historical Provider, MD   Cholecalciferol (VITAMIN D) 2000 units CAPS capsule Take 1 capsule by mouth daily Yes Historical Provider, MD   ALPRAZolam (XANAX) 0.25 MG tablet Take 0.25 mg by mouth daily as needed  Yes Historical Provider, MD      Past Medical History:  Past Medical History:   Diagnosis Date    A-fib ([facility]     Anxiety     CHF (congestive heart failure) ([facility]     COPD (chronic obstructive pulmonary disease) ([facility]     Depression     Diabetes mellitus ([facility]     Heart palpitations     Hyperlipidemia     Hypertension     Sleep apnea     Urinary incontinence      Past Surgical History:    has a past surgical history that includes Carpal tunnel release; cyst removal; Atrial ablation surgery; thoracotomy; and Breast biopsy. Social History:  Reviewed. reports that she quit smoking about 11 years ago. She has never used smokeless tobacco. She reports that she does not drink alcohol or use drugs. Family History:  Reviewed. family history includes Cancer in her maternal grandfather, paternal grandfather, and sister; Diabetes in her maternal grandmother and mother; Glaucoma in her father; Heart Disease in her mother; High Blood Pressure in her mother; High Cholesterol in her brother, mother, and sister; Stroke in her mother.      Review of System:  · Constitutional: Negative for fever, night sweats, chills, weight changes, or weakness  · Skin: Negative for rash, dry skin, pruritus, bruising, bleeding, blood clots, or changes in skin pigment  · HEENT: Negative for vision changes, ringing in the ears, sore throat, dysphagia, or swollen lymph nodes  · Respiratory: Reviewed in HPI  · Cardiovascular: Reviewed in HPI  · Gastrointestinal: Negative for abdominal pain, N/V/D, constipation, or black/tarry stools  · Genito-Urinary: Negative for dysuria, incontinence, urgency, or hematuria  · Musculoskeletal: Negative for joint swelling, muscle pain, or injuries  · Neurological/Psych: Negative for confusion, seizures, dizziness, headaches, balance issues or TIA-like symptoms. No anxiety, depression, or insomnia    Physical Examination:  Vitals:    05/11/21 1400   BP: 136/80   Pulse: 69   SpO2: 96%      Wt Readings from Last 3 Encounters:   05/11/21 242 lb (109.8 kg)   02/05/21 245 lb 6.4 oz (111.3 kg)   10/02/20 250 lb (113.4 kg)     Constitutional: Cooperative and in no apparent distress, and appears well nourished  Skin: Warm and pink; no pallor, cyanosis, bruising, or clubbing  HEENT: Symmetric and normocephalic. PERRL, EOM intact. Conjunctiva pink with clear sclera. Mucus membranes pink and moist. Teeth intact. Thyroid smooth without nodules or goiter  Respiratory: Respirations symmetric and unlabored. Lungs clear to auscultation bilaterally, no wheezing, rhonchi, or crackles  Cardiovascular:  Regular rate and rhythm. S1/S2 present without murmurs, rubs, or gallops. Peripheral pulses 2+, capillary refill < 3 seconds. No elevation of JVP. No peripheral edema  Gastrointestinal: Abdomen soft and round. Bowel sounds normoactive in all quadrants without tenderness or masses. + Obese  Musculoskeletal: Bilateral upper and lower extremity strength 5/5 with full ROM. Neurological/Psych: Awake and orientated to person, place and time. Calm affect, appropriate mood.      Pertinent labs, diagnostic, device, and imaging results reviewed as a part of this visit    LABS    CBC:   Lab Results   Component Value Date    WBC 4.4 07/23/2020    HGB 13.4 07/23/2020    HCT 40.0 07/23/2020    MCV 76.7 (L) 07/23/2020     07/23/2020     BMP:   Lab Results   Component Value Date    CREATININE 0.91 07/23/2020    BUN 14 07/23/2020     07/23/2020    K 4.2 07/23/2020     07/23/2020    CO2 25 07/23/2020     CrCl cannot be calculated (Patient's most recent lab result is older than the maximum 120 days allowed. ). Thyroid:   Lab Results   Component Value Date    TSH 0.452 2020     Lipid Panel:   Lab Results   Component Value Date    CHOL 126 2020    HDL 50 2020    TRIG 93 2020     LFTs:  Lab Results   Component Value Date    ALT 26 2020    AST 18 2020    ALKPHOS 80 2020    BILITOT 0.4 2020     Coags:   Lab Results   Component Value Date    PROTIME 11.5 2019    INR 1.01 2019       EC2021  - NSR, rate 67, QTc 426    DEEPIKA:    Normal left ventricle size, wall thickness, and systolic function with an estimated ejection fraction of 55-60%. No regional wall motion abnormalities are seen. Left atrial size appears dilated. 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 PFO with left to right shunt noted. Echo:  ([facility])  EF 60-65%. LA moderately dilated. GXT: 10/14  Normal myocardial perfusion study.     Normal LV function. Event Monitor:   NSR. Average HR 71, low 50, high 121  Symptoms with PAC, most episodes with sinus rhythm    Assessment:    1. Paroxysmal Atrial Fibrillation/flutter  - S/p RFCA with PVI, CVTI atrial flutter (12/15/16)  - S/p RFCA with PVI, roof line, inferior/posterior box, ablation of atypical atrial flutter (19)    - Currently in NSR  - Continue metoprolol 25 mg BID (may take extra 12.5 mg daily for palpitations)     - [id number] score: 1 (HTN) ; [id number] Vasc score and anticoagulation discussed. High risk for stroke and thromboembolism. Anticoagulation is recommended. Risk of bleeding was discussed. ~ Off anti-coagulation s/p ablation; previously on Xarelto  ~ Continue ASA daily    - Afib risk factors including age, HTN, obesity, inactivity and SUSIE were discussed with patient.  Risk factor modification recommended      - Treatment options including cardioversion, rate control strategy, antiarrhythmics, anticoagulation and possible ablation were discussed with patient. Risks, benefits and alternative of each treatment options were explained. All questions answered    2. PAC   - Symptomatic with palpitations/skipped beats   - Continue BB; may take extra 12.5 mg daily for symptoms   - Reassurance provided     - Encouraged to exercise as tolerated   - Avoid/limit stress   - Limit caffeine/alcohol use    3. HTN  - Controlled: Goal <130/80  - Continue current medications  - Encouraged to monitor and log BP readings at home, then bring log to next visit  - Discussed importance of low sodium diet, weight control and exercise    4. SUSIE  - Stable: Uses CPAP  - Encourage to use CPAP to prevent long term effects of untreated SUSIE    5. Hyperlipidemia  - Controlled. Goal: LDL <100   ~ LDL 58 (1/21)  - On simvastatin 40 mg QD  - Discussed diet, weight loss, and exercise needs  - Followed per PCP    6. Anemia   - Stable    ~ H/H 14.6/43.5 (1/21)   - On PO iron daily    7. Hypothyroidism   - Stable    ~ TSH 0.284 (1/21)   - On synthroid   - Followed by her PCP    8. Obesity  - Body mass index is 42.87 kg/m². - Complicating assessment and treatment. Placing patient at risk for multiple co-morbidities as well as early death and contributing to the patient's presentation  - Discussed weight loss and patient was encouraged to reduce calorie intake and increase exercise    Plan:  1. No changes  2. May take an extra 12.5 mg of metoprolol if palpitations worsen  3.  Exercise as tolerated    F/U: Follow-up with NPSR in 6 months  -Call Devora Singh at [phone] with any questions    Diet & Exercise:   The patient is counseled to follow a low salt diet to assure blood pressure remains controlled for cardiovascular risk factor modification   The patient is counseled to avoid excess caffeine, and energy drinks as this may exacerbated ectopy and arrhythmia   The patient is counseled to lose weight to control cardiovascular risk factors   Exercise program discussed: To improve overall cardiovascular health, the patient is instructed to increase cardiovascular related activities with a goal of 150 min/week of moderate level activity or 10,000 steps per day. Encouraged to perform as much activity as tolerated     I have addressed the patient's cardiac risk factors and adjusted pharmacologic treatment as needed. In addition, I have reinforced the need for patient directed risk factor modification. I independently reviewed the ECG    All questions and concerns were addressed with the patient. Alternatives to treatment were discussed. Thank you for allowing to us to participate in the care of Yolande Hummel     30-39 minutes spent preparing to see patient including reviewing patient history/prior tests/prior consults, performing a medical exam, counseling and educating patient/family/caregiver, ordering medications/tests/procedures, referring and communicating with PCPs and other pertinent consultants, documenting information in the EMR, independently interpreting results and communicating to family and coordination of patient care.      Bre Nurse, ZORAN [address]   Office: [phone]

## 2021-05-11 ENCOUNTER — OFFICE VISIT (OUTPATIENT)
Dept: CARDIOLOGY CLINIC | Age: 61
End: 2021-05-11
Payer: COMMERCIAL

## 2021-05-11 VITALS
WEIGHT: 242 LBS | HEIGHT: 63 IN | OXYGEN SATURATION: 96 % | SYSTOLIC BLOOD PRESSURE: 136 MMHG | BODY MASS INDEX: 42.88 KG/M2 | HEART RATE: 69 BPM | DIASTOLIC BLOOD PRESSURE: 80 MMHG

## 2021-05-11 DIAGNOSIS — G47.33 OSA ON CPAP: ICD-10-CM

## 2021-05-11 DIAGNOSIS — E66.01 MORBID OBESITY WITH BMI OF 40.0-44.9, ADULT (HCC): ICD-10-CM

## 2021-05-11 DIAGNOSIS — I47.1 PAT (PAROXYSMAL ATRIAL TACHYCARDIA) (HCC): ICD-10-CM

## 2021-05-11 DIAGNOSIS — E78.2 MIXED HYPERLIPIDEMIA: Chronic | ICD-10-CM

## 2021-05-11 DIAGNOSIS — I10 ESSENTIAL HYPERTENSION: Chronic | ICD-10-CM

## 2021-05-11 DIAGNOSIS — I48.0 PAROXYSMAL ATRIAL FIBRILLATION (HCC): Primary | Chronic | ICD-10-CM

## 2021-05-11 DIAGNOSIS — Z99.89 OSA ON CPAP: ICD-10-CM

## 2021-05-11 PROBLEM — I47.19 PAT (PAROXYSMAL ATRIAL TACHYCARDIA): Status: ACTIVE | Noted: 2021-05-11

## 2021-05-11 PROCEDURE — 99214 OFFICE O/P EST MOD 30 MIN: CPT | Performed by: NURSE PRACTITIONER

## 2021-05-11 PROCEDURE — 93000 ELECTROCARDIOGRAM COMPLETE: CPT | Performed by: NURSE PRACTITIONER

## 2021-05-11 RX ORDER — LEVOTHYROXINE SODIUM 0.12 MG/1
TABLET ORAL
COMMUNITY
Start: 2021-04-24

## 2021-05-11 RX ORDER — SIMVASTATIN 20 MG
40 TABLET ORAL NIGHTLY
Qty: 30 TABLET | Refills: 0 | Status: SHIPPED
Start: 2021-05-11

## 2021-05-11 NOTE — PATIENT INSTRUCTIONS
1. No changes  2. May take an extra 12.5 mg of metoprolol if palpitations worsen  3.  Exercise as tolerated

## 2021-05-17 ENCOUNTER — TELEPHONE (OUTPATIENT)
Dept: CARDIOLOGY CLINIC | Age: 61
End: 2021-05-17

## 2021-05-17 NOTE — TELEPHONE ENCOUNTER
She has the opportunity to go to [facility] in June . Does NPSR think it is ok for her to fly and go on vacation ?

## 2021-10-19 NOTE — PROGRESS NOTES
[address]   Electrophysiology  ZORAN An  Attending EP: Dr. Dulce Maria Curtis    Date: 11/12/2021  I had the privilege of visiting Jason Ulrich in the office. Chief Complaint:   Chief Complaint   Patient presents with    Atrial Fibrillation     No complaints     6 Month Follow-Up     History of Present Illness: History obtained from patient and medical record. Jason Ulrich is 56 y.o. female with a past medical history of aflutter/afib, HTN, SUSIE, and hyperlipidemia. First known episode of afib in 2010, which was symptomatic and treated with rhythmol unsuccessfully. S/p RFCA with PVI, atypical atrial flutter (6/19/19, Dr. Dulce Maria Curtis). -Interval history: Today, Jason Ulrich is being seen for routine follow up. She remains in sinus rhythm. Pt denies any recurrent atrial fibrillation episodes. Her BP is stable, 136/78. She does not routinely monitor her blood pressure at home. She has lost 25 lbs over the last year with diet changes. She is compliant with her CPAP at night. She works as an  at [employer]. Denies having chest pain, palpitations, shortness of breath, orthopnea/PND, cough, or dizziness at the time of this visit. With regard to medication therapy the patient has been compliant with prescribed regimen. They have tolerated therapy to date. Allergies: Allergies   Allergen Reactions    Vicodin [Hydrocodone-Acetaminophen] Nausea And Vomiting     Home Medications:  Prior to Visit Medications    Medication Sig Taking?  Authorizing Provider   amLODIPine (NORVASC) 2.5 MG tablet  Yes Historical Provider, MD   hydrOXYzine (VISTARIL) 25 MG capsule Take 25 mg by mouth every 6 hours as needed Yes Historical Provider, MD   metoprolol tartrate (LOPRESSOR) 25 MG tablet TAKE 1 TABLET BY MOUTH TWICE A DAY Yes RAZA An CNP   levothyroxine (SYNTHROID) 125 MCG tablet TAKE 1 TABLET BY MOUTH DAILY, NEXT ANNUAL WELLNESS CHECKUP MAY 2021 Yes Historical Provider, MD simvastatin (ZOCOR) 20 MG tablet Take 2 tablets by mouth nightly Yes Shantell Evans APRN - CNP   metFORMIN (GLUCOPHAGE) 500 MG tablet Take 500 mg by mouth 2 times daily (with meals)  Yes Historical Provider, MD   aspirin 81 MG EC tablet Take 81 mg by mouth daily Yes Historical Provider, MD   Cholecalciferol (VITAMIN D) 2000 units CAPS capsule Take 1 capsule by mouth daily Yes Historical Provider, MD   ALPRAZolam (XANAX) 0.25 MG tablet Take 0.25 mg by mouth daily as needed  Yes Historical Provider, MD      Past Medical History:  Past Medical History:   Diagnosis Date    [facility]fib ([facility]     Anxiety     CHF (congestive heart failure) ([facility]     COPD (chronic obstructive pulmonary disease) ([facility]     Depression     Diabetes mellitus ([facility]     Heart palpitations     Hyperlipidemia     Hypertension     Sleep apnea     Urinary incontinence      Past Surgical History:    has a past surgical history that includes Carpal tunnel release; cyst removal; Atrial ablation surgery; thoracotomy; and Breast biopsy. Social History:  Reviewed. reports that she quit smoking about 11 years ago. She has never used smokeless tobacco. She reports that she does not drink alcohol and does not use drugs. Family History:  Reviewed. family history includes Cancer in her maternal grandfather, paternal grandfather, and sister; Diabetes in her maternal grandmother and mother; Glaucoma in her father; Heart Disease in her mother; High Blood Pressure in her mother; High Cholesterol in her brother, mother, and sister; Stroke in her mother.      Review of System:  · Constitutional: Negative for fever, night sweats, chills, weight changes, or weakness  · Skin: Negative for rash, dry skin, pruritus, bruising, bleeding, blood clots, or changes in skin pigment  · HEENT: Negative for vision changes, ringing in the ears, sore throat, dysphagia, or swollen lymph nodes  · Respiratory: Reviewed in HPI  · Cardiovascular: Reviewed in HPI  · Gastrointestinal: Negative for abdominal pain, N/V/D, constipation, or black/tarry stools  · Genito-Urinary: Negative for dysuria, incontinence, urgency, or hematuria  · Musculoskeletal: Negative for joint swelling, muscle pain, or injuries  · Neurological/Psych: Negative for confusion, seizures, dizziness, headaches, balance issues or TIA-like symptoms. No anxiety, depression, or insomnia    Physical Examination:  Vitals:    11/12/21 1508   BP: 136/78   Pulse: 70   SpO2: 97%      Wt Readings from Last 3 Encounters:   11/12/21 230 lb 12.8 oz (104.7 kg)   05/11/21 242 lb (109.8 kg)   02/05/21 245 lb 6.4 oz (111.3 kg)     Constitutional: Cooperative and in no apparent distress, and appears well nourished  Skin: Warm and pink; no pallor, cyanosis, bruising, or clubbing  HEENT: Symmetric and normocephalic. PERRL, EOM intact. Conjunctiva pink with clear sclera. Mucus membranes pink and moist. Teeth intact. Thyroid smooth without nodules or goiter  Respiratory: Respirations symmetric and unlabored. Lungs clear to auscultation bilaterally, no wheezing, rhonchi, or crackles  Cardiovascular:  Regular rate and rhythm. S1/S2 present without murmurs, rubs, or gallops. Peripheral pulses 2+, capillary refill < 3 seconds. No elevation of JVP. No peripheral edema  Gastrointestinal: Abdomen soft and round. Bowel sounds normoactive in all quadrants without tenderness or masses. + Obese  Musculoskeletal: Bilateral upper and lower extremity strength 5/5 with full ROM. Neurological/Psych: Awake and orientated to person, place and time. Calm affect, appropriate mood.      Pertinent labs, diagnostic, device, and imaging results reviewed as a part of this visit    LABS    Labs reviewed from Tracey Yaimasujeysonali  in [facility] (7/21)    CBC:   Lab Results   Component Value Date    WBC 4.4 07/23/2020    HGB 13.4 07/23/2020    HCT 40.0 07/23/2020    MCV 76.7 (L) 07/23/2020     07/23/2020     BMP:   Lab Results   Component Value Date CREATININE 0.91 2020    BUN 14 2020     2020    K 4.2 2020     2020    CO2 25 2020     CrCl cannot be calculated (Patient's most recent lab result is older than the maximum 120 days allowed. ). Thyroid:   Lab Results   Component Value Date    TSH 0.452 2020     Lipid Panel:   Lab Results   Component Value Date    CHOL 126 2020    HDL 50 2020    TRIG 93 2020     LFTs:  Lab Results   Component Value Date    ALT 26 2020    AST 18 2020    ALKPHOS 80 2020    BILITOT 0.4 2020     Coags:   Lab Results   Component Value Date    PROTIME 11.5 2019    INR 1.01 2019       EC2021  - NSR, rate 70, QRS 96, QTc 405    DEEPIKA:    Normal left ventricle size, wall thickness, and systolic function with an estimated ejection fraction of 55-60%. No regional wall motion abnormalities are seen. Left atrial size appears dilated. 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 PFO with left to right shunt noted. [facility]  EF 60-65%. LA moderately dilated. GXT: 10/14  Normal myocardial perfusion study.     Normal LV function. Event Monitor:   NSR. Average HR 71, low 50, high 121  Symptoms with PAC, most episodes with sinus rhythm    Assessment:    1. Paroxysmal Atrial Fibrillation/flutter  - S/p RFCA with PVI, CVTI atrial flutter (12/15/16)  - S/p RFCA with PVI, roof line, inferior/posterior box, ablation of atypical atrial flutter (19)    - Currently in NSR  - Continue metoprolol 25 mg BID (may take extra 12.5 mg daily for palpitations)     - [id number] score: 1 (HTN) ; [id number] Vasc score and anticoagulation discussed. High risk for stroke and thromboembolism. Anticoagulation is recommended. Risk of bleeding was discussed.   ~ Off anti-coagulation s/p ablation; previously on Xarelto  ~ Continue ASA daily    - Afib risk factors including age, HTN, obesity, inactivity and SUSIE were discussed with patient. Risk factor modification recommended      - Treatment options including cardioversion, rate control strategy, antiarrhythmics, anticoagulation and possible ablation were discussed with patient. Risks, benefits and alternative of each treatment options were explained. All questions answered    2. PAC   - Symptomatic with palpitations/skipped beats   - Continue BB; may take extra 12.5 mg daily for symptoms   - Reassurance provided     - Encouraged to exercise as tolerated   - Avoid/limit stress   - Limit caffeine/alcohol use    3. HTN  - Controlled: Goal <130/80  - Continue current medications  - Encouraged to monitor and log BP readings at home, then bring log to next visit  - Discussed importance of low sodium diet, weight control and exercise    4. SUSIE  - Stable: Uses CPAP  - Encourage to use CPAP to prevent long term effects of untreated SUSIE    5. Hyperlipidemia  - Controlled. Goal: LDL <100   ~ LDL 54 (7/21)  - On simvastatin 40 mg QD  - Discussed diet, weight loss, and exercise needs  - Followed per PCP    6. Hypothyroidism   - Stable    ~ TSH 0.525 (7/21)   - On synthroid   - Followed by her PCP    7. Obesity  - Body mass index is 41.54 kg/m². - Complicating assessment and treatment. Placing patient at risk for multiple co-morbidities as well as early death and contributing to the patient's presentation  - Discussed weight loss and patient was encouraged to reduce calorie intake and increase exercise  - Down 25 lbs this year with diet changes    Plan:  1. No changes  2. Call office if episodes of atrial fibrillation  3.  Continue diet changes and weight loss    F/U: Follow-up with NPSR in one year  -Call Devora Singh at [phone] with any questions    Diet & Exercise:   The patient is counseled to follow a low salt diet to assure blood pressure remains controlled for cardiovascular risk factor modification   The patient is counseled to avoid excess caffeine, and energy drinks as this may exacerbated ectopy and arrhythmia   The patient is counseled to lose weight to control cardiovascular risk factors   Exercise program discussed: To improve overall cardiovascular health, the patient is instructed to increase cardiovascular related activities with a goal of 150 min/week of moderate level activity or 10,000 steps per day. Encouraged to perform as much activity as tolerated     I have addressed the patient's cardiac risk factors and adjusted pharmacologic treatment as needed. In addition, I have reinforced the need for patient directed risk factor modification. I independently reviewed the ECG    All questions and concerns were addressed with the patient. Alternatives to treatment were discussed. Thank you for allowing to us to participate in the care of Jamie Valdez     32 minutes spent preparing to see patient including reviewing patient history/prior tests/prior consults, performing a medical exam, counseling and educating patient/family/caregiver, ordering medications/tests/procedures, referring and communicating with PCPs and other pertinent consultants, documenting information in the EMR, independently interpreting results and communicating to family and coordination of patient care.      [facility]tein, APRN-CNP  [facility]   Office: [phone]

## 2021-11-12 ENCOUNTER — OFFICE VISIT (OUTPATIENT)
Dept: CARDIOLOGY CLINIC | Age: 61
End: 2021-11-12
Payer: COMMERCIAL

## 2021-11-12 VITALS
OXYGEN SATURATION: 97 % | HEIGHT: 63 IN | BODY MASS INDEX: 40.89 KG/M2 | SYSTOLIC BLOOD PRESSURE: 136 MMHG | DIASTOLIC BLOOD PRESSURE: 78 MMHG | WEIGHT: 230.8 LBS | HEART RATE: 70 BPM

## 2021-11-12 DIAGNOSIS — I48.0 PAROXYSMAL ATRIAL FIBRILLATION (HCC): Primary | Chronic | ICD-10-CM

## 2021-11-12 DIAGNOSIS — Z99.89 OSA ON CPAP: ICD-10-CM

## 2021-11-12 DIAGNOSIS — E66.01 MORBID OBESITY WITH BMI OF 40.0-44.9, ADULT (HCC): ICD-10-CM

## 2021-11-12 DIAGNOSIS — E78.2 MIXED HYPERLIPIDEMIA: Chronic | ICD-10-CM

## 2021-11-12 DIAGNOSIS — I47.1 PAT (PAROXYSMAL ATRIAL TACHYCARDIA) (HCC): ICD-10-CM

## 2021-11-12 DIAGNOSIS — G47.33 OSA ON CPAP: ICD-10-CM

## 2021-11-12 DIAGNOSIS — I10 ESSENTIAL HYPERTENSION: Chronic | ICD-10-CM

## 2021-11-12 PROCEDURE — 99214 OFFICE O/P EST MOD 30 MIN: CPT | Performed by: NURSE PRACTITIONER

## 2021-11-12 PROCEDURE — 93000 ELECTROCARDIOGRAM COMPLETE: CPT | Performed by: NURSE PRACTITIONER

## 2021-11-12 RX ORDER — HYDROXYZINE PAMOATE 25 MG/1
25 CAPSULE ORAL EVERY 6 HOURS PRN
COMMUNITY
Start: 2021-08-15

## 2021-11-12 RX ORDER — AMLODIPINE BESYLATE 2.5 MG/1
TABLET ORAL
COMMUNITY
Start: 2021-11-04

## 2022-02-21 NOTE — TELEPHONE ENCOUNTER
Last OV:11/12/2021    Last Labs: EKG-11/12/2021    Last Refill:8/2/2021    Next OV: on recall 11/2022 npsr

## 2022-05-11 NOTE — TELEPHONE ENCOUNTER
Received refill request for Metoprolol Tartrate 25mg from [facility] : 11/12/2021 with NPSR    Last EKG : 11/12/2021    Last Refill : 02/21/2022 180 w/1 refill    Next OV : 11/12/2022 with NPSR

## 2022-10-19 ENCOUNTER — TELEPHONE (OUTPATIENT)
Dept: CARDIOLOGY CLINIC | Age: 62
End: 2022-10-19

## 2022-10-20 NOTE — TELEPHONE ENCOUNTER
Medication Refill    Medication needing refilled:metoprolol tartrate (LOPRESSOR) 25 MG tablet       Dosage of the medication:    How are you taking this medication (QD, BID, TID, QID, PRN): 2 x day    30 or 90 day supply called in:90    When will you run out of your medication:    Which Pharmacy are we sending the medication to?: [address]  Phone: [phone]  Ref#: [id number]    [address] from [facility] called to get new script for metoprolol.

## 2022-10-20 NOTE — TELEPHONE ENCOUNTER
Received refill request for Metoprolol from [facility] : 11/12/21 NPSR    Last EKG : 11/12/21     Last Refill : 5/11/22 180w3 sent to [facility]

## 2022-11-10 ENCOUNTER — TELEPHONE (OUTPATIENT)
Dept: CARDIOLOGY CLINIC | Age: 62
End: 2022-11-10

## 2022-11-10 NOTE — TELEPHONE ENCOUNTER
Medication Refill    Medication needing refilled:  metoprolol tartrate (LOPRESSOR)    Dosage of the medication:  25mg    How are you taking this medication (QD, BID, TID, QID, PRN):  TAKE 1 TABLET BY MOUTH TWICE A DAY    30 or 90 day supply called in:  180    When will you run out of your medication:    Which Pharmacy are we sending the medication to?:    [facility] ([facility]) - Chris Segura  [phone] Trinity Reyes [phone]   [address]

## 2022-11-10 NOTE — TELEPHONE ENCOUNTER
Pended script , called pt to let her know she needs an appointment    Last OV: 11/12/21  Last labs: HR at last visit 70  Appt scheduled :  none

## 2022-11-15 NOTE — PROGRESS NOTES
[facility]   Electrophysiology  ZORAN Werner  Attending EP: Dr. Keisha Thomas    Date: 11/21/2022  I had the privilege of visiting Edwena Paget in the office. Chief Complaint:   Chief Complaint   Patient presents with    Atrial Fibrillation    Hypertension       History of Present Illness: History obtained from patient and medical record. Edwena Paget is 59 y.o. female with a past medical history of aflutter/afib, HTN, SUSIE, and hyperlipidemia. First known episode of afib in 2010, which was symptomatic and treated with rhythmol unsuccessfully. S/p RFCA with PVI, atypical atrial flutter (6/19/19, Dr. Keisha Thomas). -Interval history: Today, Edwena Paget is being seen for routine EP follow up. She is doing well. She states she has had a few \"spells\". She states she feels \"off\" and her symptoms will resolve after a minute. It is different than her AF episodes in the past. She denies any heart racing or palpitations. She thinks it may be her blood sugar. She continues to work as an , but is hoping to retire at 70. She is compliant with her CPAP at night. Denies having chest pain, palpitations, shortness of breath, orthopnea/PND, cough, or dizziness at the time of this visit. With regard to medication therapy the patient has been compliant with prescribed regimen. They have tolerated therapy to date. Allergies: Allergies   Allergen Reactions    Vicodin [Hydrocodone-Acetaminophen] Nausea And Vomiting     Home Medications:  Prior to Visit Medications    Medication Sig Taking?  Authorizing Provider   Tirzepatide [facility]) 5 MG/0.5ML SOPN SC injection Inject into the skin every 7 days Yes Historical Provider, MD   metoprolol tartrate (LOPRESSOR) 25 MG tablet TAKE 1 TABLET BY MOUTH TWICE A DAY Yes RAZA Werner CNP   amLODIPine (NORVASC) 2.5 MG tablet Take 2.5 mg by mouth daily Yes Historical Provider, MD   hydrOXYzine (VISTARIL) 25 MG capsule Take 25 mg by mouth every 6 hours as needed Yes Historical Provider, MD   levothyroxine (SYNTHROID) 125 MCG tablet Take 114 mcg by mouth Daily Yes Historical Provider, MD   simvastatin (ZOCOR) 20 MG tablet Take 2 tablets by mouth nightly Yes RAZA Gross CNP   aspirin 81 MG EC tablet Take 81 mg by mouth daily Yes Historical Provider, MD   Cholecalciferol (VITAMIN D) 2000 units CAPS capsule Take 1 capsule by mouth daily Yes Historical Provider, MD   ALPRAZolam (XANAX) 0.25 MG tablet Take 0.25 mg by mouth daily as needed  Yes Historical Provider, MD      Past Medical History:  Past Medical History:   Diagnosis Date    A-fib ([facility]     Anxiety     CHF (congestive heart failure) ([facility])     COPD (chronic obstructive pulmonary disease) ([facility]     Depression     Diabetes mellitus ([facility]     Heart palpitations     Hyperlipidemia     Hypertension     Sleep apnea     Urinary incontinence      Past Surgical History:    has a past surgical history that includes Carpal tunnel release; cyst removal; Atrial ablation surgery; thoracotomy; and Breast biopsy. Social History:  Reviewed. reports that she quit smoking about 12 years ago. She has never used smokeless tobacco. She reports that she does not drink alcohol and does not use drugs. Family History:  Reviewed. family history includes Cancer in her maternal grandfather, paternal grandfather, and sister; Diabetes in her maternal grandmother and mother; Glaucoma in her father; Heart Disease in her mother; High Blood Pressure in her mother; High Cholesterol in her brother, mother, and sister; Stroke in her mother.      Review of System:  Constitutional: Negative for fever, night sweats, chills, weight changes, or weakness  Skin: Negative for rash, dry skin, pruritus, bruising, bleeding, blood clots, or changes in skin pigment  HEENT: Negative for vision changes, ringing in the ears, sore throat, dysphagia, or swollen lymph nodes  Respiratory: Reviewed in HPI  Cardiovascular: Reviewed in HPI  Gastrointestinal: Negative for abdominal pain, N/V/D, constipation, or black/tarry stools  Genito-Urinary: Negative for dysuria, incontinence, urgency, or hematuria  Musculoskeletal: Negative for joint swelling, muscle pain, or injuries  Neurological/Psych: Negative for confusion, seizures, dizziness, headaches, balance issues or TIA-like symptoms. No anxiety, depression, or insomnia    Physical Examination:  Vitals:    11/21/22 1420   BP: 115/72   Pulse: 70   SpO2: 96%        Wt Readings from Last 3 Encounters:   11/21/22 239 lb 12.8 oz (108.8 kg)   11/12/21 230 lb 12.8 oz (104.7 kg)   05/11/21 242 lb (109.8 kg)     Constitutional: Cooperative and in no apparent distress, and appears well nourished  Skin: Warm and pink; no pallor, cyanosis, bruising, or clubbing  HEENT: Symmetric and normocephalic. PERRL, EOM intact. Conjunctiva pink with clear sclera. Mucus membranes pink and moist. Teeth intact. Thyroid smooth without nodules or goiter  Respiratory: Respirations symmetric and unlabored. Lungs clear to auscultation bilaterally, no wheezing, rhonchi, or crackles  Cardiovascular:  Regular rate and rhythm. S1/S2 present without murmurs, rubs, or gallops. Peripheral pulses 2+, capillary refill < 3 seconds. No elevation of JVP. No peripheral edema  Gastrointestinal: Abdomen soft and round. Bowel sounds normoactive in all quadrants without tenderness or masses. + Obese  Musculoskeletal: Bilateral upper and lower extremity strength 5/5 with full ROM. Neurological/Psych: Awake and orientated to person, place and time. Calm affect, appropriate mood.      Pertinent labs, diagnostic, device, and imaging results reviewed as a part of this visit    LABS    Labs reviewed from [facility]  in Care everywhere (7/22)    CBC:   Lab Results   Component Value Date    WBC 4.4 07/23/2020    HGB 13.4 07/23/2020    HCT 40.0 07/23/2020    MCV 76.7 (L) 07/23/2020     07/23/2020     BMP:   Lab Results   Component Value Date CREATININE 0.91 2020    BUN 14 2020     2020    K 4.2 2020     2020    CO2 25 2020     CrCl cannot be calculated (Patient's most recent lab result is older than the maximum 180 days allowed. ). Thyroid:   Lab Results   Component Value Date    TSH 0.452 2020     Lipid Panel:   Lab Results   Component Value Date/Time    CHOL 126 2020 08:05 AM    HDL 50 2020 08:05 AM    TRIG 93 2020 08:05 AM     LFTs:  Lab Results   Component Value Date    ALT 26 2020    AST 18 2020    ALKPHOS 80 2020    BILITOT 0.4 2020     Coags:   Lab Results   Component Value Date    PROTIME 11.5 2019    INR 1.01 2019       EC2022  - NSR. Rate 63, QRS 94, QTc 403    DEEPIKA:    Normal left ventricle size, wall thickness, and systolic function with an estimated ejection fraction of 55-60%. No regional wall motion abnormalities are seen. Left atrial size appears dilated. 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 PFO with left to right shunt noted. Echo:  ([facility])  EF 60-65%. LA moderately dilated. GXT: 10/14  Normal myocardial perfusion study. Normal LV function. Event Monitor:   NSR. Average HR 71, low 50, high 121  Symptoms with PAC, most episodes with sinus rhythm    Assessment:    1. Paroxysmal Atrial Fibrillation/flutter  - S/p RFCA with PVI, CVTI atrial flutter (12/15/16)  - S/p RFCA with PVI, roof line, inferior/posterior box, ablation of atypical atrial flutter (19)    - Currently in NSR  - On metoprolol 25 mg BID (may take extra 12.5 mg daily for palpitations). We discussed weaning off metoprolol.      - [id number] score: 1 (HTN) ; [id number] Vasc score and anticoagulation discussed. High risk for stroke and thromboembolism. Anticoagulation is recommended. Risk of bleeding was discussed.   ~ Off anti-coagulation s/p ablation; previously on Xarelto  ~ Continue ASA daily    - Afib risk factors including age, HTN, obesity, inactivity and SUSIE were discussed with patient. Risk factor modification recommended    ~ TSH 0.819 (7/22)     - At this point, she is doing very well and denies any known recurrence of AF. Will reduce metoprolol to once a day for a month. If feeling well, ok to stop it. If her symptoms worsen with reduction, she should go back to BID dosing. We can also do a holter to correlate her symptoms if they were to recur prior to her next visit    2. PAC   - Symptomatic with palpitations/skipped beats in the past   - On BB; may take extra 12.5 mg daily for symptoms   - Reassurance provided     - Encouraged to exercise as tolerated   - Avoid/limit stress   - Limit caffeine/alcohol use    3. HTN  - Controlled: Goal <130/80  - Continue current medications  - Encouraged to monitor and log BP readings at home, then bring log to next visit  - Discussed importance of low sodium diet, weight control and exercise    4. SUSIE  - Stable: Uses CPAP  - Encourage to use CPAP to prevent long term effects of untreated SUSIE    5. Hyperlipidemia  - Controlled. Goal: LDL <100   ~ LDL 71 (7/22)  - On simvastatin 40 mg QD  - Discussed diet, weight loss, and exercise needs  - Followed per PCP    6. Hypothyroidism   - Stable    ~ TSH normal (7/22)   - On synthroid    7. Obesity  - Body mass index is 43.16 kg/m². - Complicating assessment and treatment. Placing patient at risk for multiple co-morbidities as well as early death and contributing to the patient's presentation  - Discussed weight loss and patient was encouraged to reduce calorie intake and increase exercise    Plan:  1. Reduce metoprolol to once a day for a month. If feeling well, ok to stop it  2. Call office if any issues and will do holter monitor  3. Work on weight loss and exercise    F/U: Follow-up with Dr. Pamela Chamberlain in 1 year  -Call [facility] at [phone] with any questions    Diet & Exercise:   The patient is counseled to follow a low salt diet to assure blood pressure remains controlled for cardiovascular risk factor modification  The patient is counseled to avoid excess caffeine, and energy drinks as this may exacerbated ectopy and arrhythmia  The patient is counseled to lose weight to control cardiovascular risk factors  Exercise program discussed: To improve overall cardiovascular health, the patient is instructed to increase cardiovascular related activities with a goal of 150 min/week of moderate level activity or 10,000 steps per day. Encouraged to perform as much activity as tolerated     I have addressed the patient's cardiac risk factors and adjusted pharmacologic treatment as needed. In addition, I have reinforced the need for patient directed risk factor modification. I independently reviewed the ECG    All questions and concerns were addressed with the patient. Alternatives to treatment were discussed. Thank you for allowing to us to participate in the care of Mary Hung     30 minutes spent preparing to see patient including reviewing patient history/prior tests/prior consults, performing a medical exam, counseling and educating patient/family/caregiver, ordering medications/tests/procedures, referring and communicating with PCPs and other pertinent consultants, documenting information in the EMR, independently interpreting results and communicating to family and coordination of patient care.      RAZA Chahal-CNP  [address]   Office: [phone]

## 2022-11-21 ENCOUNTER — OFFICE VISIT (OUTPATIENT)
Dept: CARDIOLOGY CLINIC | Age: 62
End: 2022-11-21
Payer: COMMERCIAL

## 2022-11-21 VITALS
HEIGHT: 63 IN | HEART RATE: 70 BPM | OXYGEN SATURATION: 96 % | BODY MASS INDEX: 42.49 KG/M2 | SYSTOLIC BLOOD PRESSURE: 115 MMHG | WEIGHT: 239.8 LBS | DIASTOLIC BLOOD PRESSURE: 72 MMHG

## 2022-11-21 DIAGNOSIS — I10 ESSENTIAL HYPERTENSION: Chronic | ICD-10-CM

## 2022-11-21 DIAGNOSIS — E78.2 MIXED HYPERLIPIDEMIA: Chronic | ICD-10-CM

## 2022-11-21 DIAGNOSIS — E66.01 MORBID OBESITY WITH BMI OF 40.0-44.9, ADULT (HCC): ICD-10-CM

## 2022-11-21 DIAGNOSIS — Z99.89 OSA ON CPAP: ICD-10-CM

## 2022-11-21 DIAGNOSIS — I47.1 PAT (PAROXYSMAL ATRIAL TACHYCARDIA) (HCC): ICD-10-CM

## 2022-11-21 DIAGNOSIS — I48.0 PAROXYSMAL ATRIAL FIBRILLATION (HCC): Primary | Chronic | ICD-10-CM

## 2022-11-21 DIAGNOSIS — G47.33 OSA ON CPAP: ICD-10-CM

## 2022-11-21 PROCEDURE — 99214 OFFICE O/P EST MOD 30 MIN: CPT | Performed by: NURSE PRACTITIONER

## 2022-11-21 PROCEDURE — 3074F SYST BP LT 130 MM HG: CPT | Performed by: NURSE PRACTITIONER

## 2022-11-21 PROCEDURE — 3078F DIAST BP <80 MM HG: CPT | Performed by: NURSE PRACTITIONER

## 2022-11-21 PROCEDURE — 93000 ELECTROCARDIOGRAM COMPLETE: CPT | Performed by: NURSE PRACTITIONER

## 2022-11-21 RX ORDER — TIRZEPATIDE 5 MG/.5ML
INJECTION, SOLUTION SUBCUTANEOUS
COMMUNITY
Start: 2022-10-13

## 2022-12-05 ENCOUNTER — TELEPHONE (OUTPATIENT)
Dept: CARDIOLOGY CLINIC | Age: 62
End: 2022-12-05

## 2022-12-05 NOTE — TELEPHONE ENCOUNTER
Called and spoke to the patient and she is having palpitations and having SOB and also having chest discomfort. She just wants NPSR to know what's going on. She is currently doing her Metoprolol 25mg BID. Please advise.

## 2022-12-05 NOTE — TELEPHONE ENCOUNTER
Pt called stating that NPSR has taken her off of  metoprolol 25mg. Now she has Covid and she is having episode and has started taking metoprolol again. Will that be ok. Per Pt she is requesting a call back from the office.   Please advise

## 2023-04-17 NOTE — PROGRESS NOTES
Conclusion:   - Pulmonary vein re-isolations using wide area circumferential radiofrequency ablation   - Additional ablation of roof line, and inferior line with posterior box isolation  - Additional ablation of atypical left sided atrial flutter       Assessment:      Diagnosis Orders   1. Paroxysmal atrial fibrillation (HCC)   ~regular AP  ~s/p RFA with PVI Jun '19  ~hx of AF ablation and ablation of CVTI atrial flutter Dec '16  ~experienced symptomatic bradycardia > improved after metoprolol dose reduced  ~CHADs  ~LA 5.3 cm on echo from May '18  ~TSH WNL by labs on 7/11/19 EKG 12 lead   2. Essential hypertension   ~suboptimal on arrival ; improved - controlled with recheck    3. Mixed hyperlipidemia   ~well controlled on simvastatin     4. PFO (patent foramen ovale)   ~noted on DEEPIKA        Patient  is stable since hospital discharge. Plan:  EKG : sinus bradycardia 58   Decrease amiodarone to 100 mg daily    F/U in three months    I have addressed the patient's cardiac risk factors and adjusted pharmacologic treatment as needed. In addition, I have reinforced the need for patient directed risk factor modification. Further evaluation will be based upon the patient's clinical course and testing results. All questions and concerns were addressed to the patient. Alternatives to  treatment were discussed. The patient  currently  is not smoking. The risks related to smoking were reviewed with the patient. Recommend maintaining a smoke-free lifestyle. anti-coagulation has been recommended / prescribed for this patient. Education conducted on adverse reactions including bleeding was discussed. The patient verbalizes understanding.     Pt is on a BB  Pt is not on an ace-i/ARB : neg CHF  Pt is on a statin      Saturated fat diet discussed : Emmy  Exercise program discussed : recumbent bike a couple of times / week : ~ 4 miles / 20 min    Thank you for allowing to us to participate in the care of Becki NARAYAN
no

## 2023-11-16 NOTE — PROGRESS NOTES
Returned call asked her to fax over a path report. Informed patient Rosy does the medicaid scheduling and gave her fax number to schedule patient.                 ---- Message from Lauren Smith sent at 11/6/2023  1:14 PM CST -----  Regarding: moh's + plastic surgery for medicaid pt  Contact: Jose derm  Type:  Needs Medical Advice    Who Called: Jose Amor    Symptoms (please be specific): Mohs + plastic surgery     Would the patient rather a call back or a response via [facility]? Call back    Best Call Back Number:  [facility]    Additional Information: Jose Amor is trying to refer pt, who is a medicare pt, to get a mohs procedure + F/U plastic surgery.  Please advise.  Thank you.         [facility]   Electrophysiology Follow up   Date: 11/21/2023  I had the privilege of visiting Luiz Ding in the office. CC: PAF    HPI: Luiz Ding is a 57 y.o. female  with a history of aflutter/afib, HTN, SUSIE, and hyperlipidemia, DM and morbid obesity. First known episode of afib in 2010, which was symptomatic and treated with rhythmol unsuccessfully. RFCA: PVI and CTI flutter 12/15/2016   RFCA redo PVI, atypical atrial flutter (6/19/19)     Trinity Watkins presents today in one year follow up. She is taking monjauro now for her diabetes and has lost 55lbs. She states she still has brief episodes of palpitation about every month to two months that last about 10 minutes. She is taking her metoprolol PRN     Assessment and plan:   - Paroxysmal Atrial Fibrillation/flutter   EKg is Sinus rhythm   RFCA: PVI and CTI flutter 12/15/2016   RFCA redo PVI, atypical atrial flutter (6/19/19)     - Continue metoprolol 25 mg  daily  (may take extra 12.5 mg daily for palpitations). She is now taking PRN only   - She is off anti-coagulation,  previously on Xarelto  - No known recurrence      She has been diagnosed with diabetes about 2 years ago. This increases her CHADS2 Vasc score. We discussed anticoagulation including risks benefits and alternatives. We discussed ways of monitoring heart rhythm at home including implantation of loop recorder, [employer] mobile, or smart watch is. Holter will not be helpful since her episodes are rare. She will consider [employer] mobile and monitor her rhythm closely.      - PAC              - Symptomatic with palpitations/skipped beats in the past              - On BB; may take extra 12.5 mg daily for symptoms              - Reassurance provided                 - Encouraged to exercise as tolerated              - Avoid/limit stress              - Limit caffeine/alcohol use     - HTN  -Controlled  -BP goal <130/80  - continue lopressor, norvasc        -

## 2023-11-21 ENCOUNTER — OFFICE VISIT (OUTPATIENT)
Dept: CARDIOLOGY CLINIC | Age: 63
End: 2023-11-21
Payer: COMMERCIAL

## 2023-11-21 VITALS
WEIGHT: 202.4 LBS | HEART RATE: 79 BPM | SYSTOLIC BLOOD PRESSURE: 108 MMHG | HEIGHT: 63 IN | DIASTOLIC BLOOD PRESSURE: 72 MMHG | BODY MASS INDEX: 35.86 KG/M2 | OXYGEN SATURATION: 98 %

## 2023-11-21 DIAGNOSIS — E78.2 MIXED HYPERLIPIDEMIA: Chronic | ICD-10-CM

## 2023-11-21 DIAGNOSIS — I48.0 PAROXYSMAL ATRIAL FIBRILLATION (HCC): Primary | Chronic | ICD-10-CM

## 2023-11-21 DIAGNOSIS — G47.33 OSA ON CPAP: ICD-10-CM

## 2023-11-21 DIAGNOSIS — E66.01 MORBID OBESITY WITH BMI OF 40.0-44.9, ADULT (HCC): ICD-10-CM

## 2023-11-21 DIAGNOSIS — I10 ESSENTIAL HYPERTENSION: Chronic | ICD-10-CM

## 2023-11-21 PROCEDURE — 3078F DIAST BP <80 MM HG: CPT | Performed by: INTERNAL MEDICINE

## 2023-11-21 PROCEDURE — 93000 ELECTROCARDIOGRAM COMPLETE: CPT | Performed by: INTERNAL MEDICINE

## 2023-11-21 PROCEDURE — 99214 OFFICE O/P EST MOD 30 MIN: CPT | Performed by: INTERNAL MEDICINE

## 2023-11-21 PROCEDURE — 3074F SYST BP LT 130 MM HG: CPT | Performed by: INTERNAL MEDICINE

## 2023-11-21 RX ORDER — TERBINAFINE HYDROCHLORIDE 250 MG/1
250 TABLET ORAL DAILY
COMMUNITY
Start: 2023-11-12

## 2024-07-29 NOTE — PROGRESS NOTES
swelling, muscle pain, or injuries  Neurological/Psych: Negative for confusion, seizures, dizziness, headaches, balance issues or TIA-like symptoms. No anxiety, depression, or insomnia    Physical Examination:  Vitals:    08/01/24 0926   BP: 116/70   Pulse: 83   SpO2: 97%      Wt Readings from Last 3 Encounters:   08/01/24 96.6 kg (213 lb)   11/21/23 91.8 kg (202 lb 6.4 oz)   11/21/22 108.8 kg (239 lb 12.8 oz)     Constitutional: Cooperative and in no apparent distress, and appears well nourished  Skin: Warm and pink; no pallor, cyanosis, bruising, or clubbing  HEENT: Symmetric and normocephalic. PERRL, EOM intact. Conjunctiva pink with clear sclera. Mucus membranes pink and moist. Teeth intact. Thyroid smooth without nodules or goiter  Respiratory: Respirations symmetric and unlabored. Lungs clear to auscultation bilaterally, no wheezing, rhonchi, or crackles  Cardiovascular:  Regular rate and rhythm. S1/S2 present without murmurs, rubs, or gallops. Peripheral pulses 2+, capillary refill < 3 seconds. No elevation of JVP. No peripheral edema  Gastrointestinal: Abdomen soft and round. Bowel sounds normoactive in all quadrants without tenderness or masses. + Obese  Musculoskeletal: Bilateral upper and lower extremity strength 5/5 with full ROM.  Neurological/Psych: Awake and orientated to person, place and time. Calm affect, appropriate mood.     Pertinent labs, diagnostic, device, and imaging results reviewed as a part of this visit    LABS    Labs reviewed from [facility] everywhere (7/22)    CBC:   Lab Results   Component Value Date    WBC 4.4 07/23/2020    HGB 13.4 07/23/2020    HCT 40.0 07/23/2020    MCV 76.7 (L) 07/23/2020     07/23/2020     BMP:   Lab Results   Component Value Date    CREATININE 0.91 07/23/2020    BUN 14 07/23/2020     07/23/2020    K 4.2 07/23/2020     07/23/2020    CO2 25 07/23/2020     CrCl cannot be calculated (Patient's most recent lab result is older than the

## 2024-08-01 ENCOUNTER — OFFICE VISIT (OUTPATIENT)
Dept: CARDIOLOGY CLINIC | Age: 64
End: 2024-08-01
Payer: MEDICARE

## 2024-08-01 VITALS
OXYGEN SATURATION: 97 % | SYSTOLIC BLOOD PRESSURE: 116 MMHG | HEIGHT: 62 IN | BODY MASS INDEX: 39.2 KG/M2 | DIASTOLIC BLOOD PRESSURE: 70 MMHG | WEIGHT: 213 LBS | HEART RATE: 83 BPM

## 2024-08-01 DIAGNOSIS — I10 ESSENTIAL HYPERTENSION: Chronic | ICD-10-CM

## 2024-08-01 DIAGNOSIS — I47.19 PAT (PAROXYSMAL ATRIAL TACHYCARDIA) (HCC): ICD-10-CM

## 2024-08-01 DIAGNOSIS — I48.0 PAROXYSMAL ATRIAL FIBRILLATION (HCC): Primary | Chronic | ICD-10-CM

## 2024-08-01 DIAGNOSIS — E78.2 MIXED HYPERLIPIDEMIA: Chronic | ICD-10-CM

## 2024-08-01 DIAGNOSIS — G47.33 OSA ON CPAP: ICD-10-CM

## 2024-08-01 DIAGNOSIS — E66.01 MORBID OBESITY WITH BMI OF 40.0-44.9, ADULT (HCC): ICD-10-CM

## 2024-08-01 PROCEDURE — 99214 OFFICE O/P EST MOD 30 MIN: CPT | Performed by: NURSE PRACTITIONER

## 2024-08-01 PROCEDURE — 3078F DIAST BP <80 MM HG: CPT | Performed by: NURSE PRACTITIONER

## 2024-08-01 PROCEDURE — 1123F ACP DISCUSS/DSCN MKR DOCD: CPT | Performed by: NURSE PRACTITIONER

## 2024-08-01 PROCEDURE — 93000 ELECTROCARDIOGRAM COMPLETE: CPT | Performed by: NURSE PRACTITIONER

## 2024-08-01 PROCEDURE — 3074F SYST BP LT 130 MM HG: CPT | Performed by: NURSE PRACTITIONER

## 2024-12-03 ENCOUNTER — TELEPHONE (OUTPATIENT)
Dept: CARDIOLOGY CLINIC | Age: 64
End: 2024-12-03

## 2024-12-03 ENCOUNTER — OFFICE VISIT (OUTPATIENT)
Dept: CARDIOLOGY CLINIC | Age: 64
End: 2024-12-03
Payer: MEDICARE

## 2024-12-03 VITALS
WEIGHT: 203 LBS | HEART RATE: 78 BPM | DIASTOLIC BLOOD PRESSURE: 76 MMHG | HEIGHT: 62 IN | SYSTOLIC BLOOD PRESSURE: 120 MMHG | OXYGEN SATURATION: 96 % | BODY MASS INDEX: 37.36 KG/M2

## 2024-12-03 DIAGNOSIS — I10 ESSENTIAL HYPERTENSION: Chronic | ICD-10-CM

## 2024-12-03 DIAGNOSIS — G47.33 OSA ON CPAP: ICD-10-CM

## 2024-12-03 DIAGNOSIS — R06.02 SOB (SHORTNESS OF BREATH) ON EXERTION: Primary | ICD-10-CM

## 2024-12-03 DIAGNOSIS — I48.0 PAROXYSMAL ATRIAL FIBRILLATION (HCC): Chronic | ICD-10-CM

## 2024-12-03 DIAGNOSIS — I48.4 ATYPICAL ATRIAL FLUTTER (HCC): ICD-10-CM

## 2024-12-03 DIAGNOSIS — E78.2 MIXED HYPERLIPIDEMIA: Chronic | ICD-10-CM

## 2024-12-03 DIAGNOSIS — R94.31 ABNORMAL EKG: ICD-10-CM

## 2024-12-03 DIAGNOSIS — R07.9 CHEST PAIN, UNSPECIFIED TYPE: ICD-10-CM

## 2024-12-03 PROCEDURE — 93000 ELECTROCARDIOGRAM COMPLETE: CPT | Performed by: INTERNAL MEDICINE

## 2024-12-03 PROCEDURE — 1123F ACP DISCUSS/DSCN MKR DOCD: CPT | Performed by: INTERNAL MEDICINE

## 2024-12-03 PROCEDURE — 3074F SYST BP LT 130 MM HG: CPT | Performed by: INTERNAL MEDICINE

## 2024-12-03 PROCEDURE — 3078F DIAST BP <80 MM HG: CPT | Performed by: INTERNAL MEDICINE

## 2024-12-03 PROCEDURE — 99214 OFFICE O/P EST MOD 30 MIN: CPT | Performed by: INTERNAL MEDICINE

## 2024-12-03 RX ORDER — TIRZEPATIDE 12.5 MG/.5ML
INJECTION, SOLUTION SUBCUTANEOUS WEEKLY
COMMUNITY

## 2024-12-03 RX ORDER — SIMVASTATIN 40 MG
40 TABLET ORAL NIGHTLY
COMMUNITY

## 2024-12-03 NOTE — PATIENT INSTRUCTIONS
Echocardiogram in [place]  Stress test and office visit at [facility]

## 2024-12-03 NOTE — PROGRESS NOTES
Cardiac Follow up       Referring Provider:  Steff Hermosillo MD     Chief Complaint   Patient presents with    New Patient    Hypertension    Atrial Fibrillation      History of Present Illness:  Ms. Becki Raymundo is a 63 y.o.  female here today for evaluation of chest pain and dyspnea.    She has a history of atrial fibrillation and is followed in the EP clinic.  She has had 2 ablations the first in 2016 and the second in 2019.  She has not had any recurrent atrial fibrillation since 2019.  She does use a [employer] mobile at home and has not had any atrial fibrillation.    She has been on Mounjaro and has lost about 50 pounds.  She says a few weeks ago she began having some tightness and congestion in her chest.  Also some shortness of breath exacerbated by exertion.  The chest tightness does seem to be somewhat positional and was related to laying on her left side at times.  The shortness of breath is moderate in intensity.  She has no lower extremity edema.  She does have a history of sleep apnea and does use CPAP.  She is a former smoker but quit smoking many years ago.      Past Medical History:   has a past medical history of A-fib ([facility]), Anxiety, CHF (congestive heart failure) ([facility]), COPD (chronic obstructive pulmonary disease) (HCC), Depression, Diabetes mellitus (HCC), Heart palpitations, Hyperlipidemia, Hypertension, Sleep apnea, and Urinary incontinence.    Surgical History:   has a past surgical history that includes Carpal tunnel release; cyst removal; Atrial ablation surgery; thoracotomy; and Breast biopsy.     Social History:   reports that she quit smoking about 14 years ago. Her smoking use included cigarettes. She has never used smokeless tobacco. She reports that she does not drink alcohol and does not use drugs.     Family History:  family history includes Cancer in her maternal grandfather, paternal grandfather, and sister; Diabetes in her maternal grandmother and mother; Glaucoma in her father;

## 2024-12-03 NOTE — TELEPHONE ENCOUNTER
Pt called to ask MMK she is having a stress test on 12/23, should she stop taking Mounjaro 12.5mg.  Please call to discuss her concerns and give direction.  Thank you

## 2024-12-11 ENCOUNTER — TELEPHONE (OUTPATIENT)
Dept: CARDIOLOGY CLINIC | Age: 64
End: 2024-12-11

## 2024-12-11 NOTE — TELEPHONE ENCOUNTER
----- Message from Dr. Mil Sandoval MD sent at 12/11/2024  9:18 AM EST -----  ECHO good. F/u with stress as planned.    ALEXIS

## 2024-12-23 ENCOUNTER — HOSPITAL ENCOUNTER (OUTPATIENT)
Age: 64
Discharge: HOME | End: 2024-12-25
Attending: INTERNAL MEDICINE
Payer: MEDICARE

## 2024-12-23 ENCOUNTER — OFFICE VISIT (OUTPATIENT)
Dept: CARDIOLOGY CLINIC | Age: 64
End: 2024-12-23
Payer: MEDICARE

## 2024-12-23 VITALS
WEIGHT: 204 LBS | SYSTOLIC BLOOD PRESSURE: 126 MMHG | OXYGEN SATURATION: 97 % | BODY MASS INDEX: 36.14 KG/M2 | DIASTOLIC BLOOD PRESSURE: 78 MMHG | HEIGHT: 63 IN | HEART RATE: 88 BPM

## 2024-12-23 VITALS — BODY MASS INDEX: 34.91 KG/M2 | WEIGHT: 197 LBS | HEIGHT: 63 IN

## 2024-12-23 DIAGNOSIS — R07.9 CHEST PAIN, UNSPECIFIED TYPE: ICD-10-CM

## 2024-12-23 DIAGNOSIS — R06.02 SOB (SHORTNESS OF BREATH) ON EXERTION: ICD-10-CM

## 2024-12-23 DIAGNOSIS — G47.33 OSA ON CPAP: ICD-10-CM

## 2024-12-23 DIAGNOSIS — R06.00 DYSPNEA, UNSPECIFIED TYPE: Primary | ICD-10-CM

## 2024-12-23 DIAGNOSIS — E78.2 MIXED HYPERLIPIDEMIA: Chronic | ICD-10-CM

## 2024-12-23 DIAGNOSIS — I48.4 ATYPICAL ATRIAL FLUTTER (HCC): ICD-10-CM

## 2024-12-23 DIAGNOSIS — I48.0 PAROXYSMAL ATRIAL FIBRILLATION (HCC): Chronic | ICD-10-CM

## 2024-12-23 LAB
ECHO BSA: 2.03 M2
NUC STRESS EJECTION FRACTION: 60 %
NUC STRESS LV EDV: 70 ML (ref 56–104)
NUC STRESS LV ESV: 28 ML (ref 19–49)
NUC STRESS LV MASS: 108 G
STRESS BASELINE DIAS BP: 91 MMHG
STRESS BASELINE HR: 85 BPM
STRESS BASELINE SYS BP: 143 MMHG
STRESS ESTIMATED WORKLOAD: 7 METS
STRESS EXERCISE DUR MIN: 5 MIN
STRESS EXERCISE DUR SEC: 0 SEC
STRESS O2 SAT PEAK: 96 %
STRESS O2 SAT REST: 98 %
STRESS PEAK DIAS BP: 67 MMHG
STRESS PEAK SYS BP: 191 MMHG
STRESS PERCENT HR ACHIEVED: 84 %
STRESS POST PEAK HR: 130 BPM
STRESS RATE PRESSURE PRODUCT: NORMAL BPM*MMHG
STRESS TARGET HR: 154 BPM
TID: 1.21

## 2024-12-23 PROCEDURE — 93016 CV STRESS TEST SUPVJ ONLY: CPT | Performed by: INTERNAL MEDICINE

## 2024-12-23 PROCEDURE — 1123F ACP DISCUSS/DSCN MKR DOCD: CPT | Performed by: INTERNAL MEDICINE

## 2024-12-23 PROCEDURE — 1159F MED LIST DOCD IN RCRD: CPT | Performed by: INTERNAL MEDICINE

## 2024-12-23 PROCEDURE — 3430000000 HC RX DIAGNOSTIC RADIOPHARMACEUTICAL: Performed by: INTERNAL MEDICINE

## 2024-12-23 PROCEDURE — 99214 OFFICE O/P EST MOD 30 MIN: CPT | Performed by: INTERNAL MEDICINE

## 2024-12-23 PROCEDURE — 93018 CV STRESS TEST I&R ONLY: CPT | Performed by: INTERNAL MEDICINE

## 2024-12-23 PROCEDURE — A9502 TC99M TETROFOSMIN: HCPCS | Performed by: INTERNAL MEDICINE

## 2024-12-23 PROCEDURE — 78452 HT MUSCLE IMAGE SPECT MULT: CPT

## 2024-12-23 PROCEDURE — 3074F SYST BP LT 130 MM HG: CPT | Performed by: INTERNAL MEDICINE

## 2024-12-23 PROCEDURE — 3078F DIAST BP <80 MM HG: CPT | Performed by: INTERNAL MEDICINE

## 2024-12-23 PROCEDURE — 78452 HT MUSCLE IMAGE SPECT MULT: CPT | Performed by: INTERNAL MEDICINE

## 2024-12-23 PROCEDURE — 93017 CV STRESS TEST TRACING ONLY: CPT

## 2024-12-23 RX ADMIN — TETROFOSMIN 10.1 MILLICURIE: 1.38 INJECTION, POWDER, LYOPHILIZED, FOR SOLUTION INTRAVENOUS at 08:34

## 2024-12-23 RX ADMIN — TETROFOSMIN 31.8 MILLICURIE: 1.38 INJECTION, POWDER, LYOPHILIZED, FOR SOLUTION INTRAVENOUS at 09:45

## 2024-12-23 NOTE — PROGRESS NOTES
maternal grandfather, paternal grandfather, and sister; Diabetes in her maternal grandmother and mother; Glaucoma in her father; Heart Disease in her mother; High Blood Pressure in her mother; High Cholesterol in her brother, mother, and sister; Stroke in her mother.     Home Medications:  Outpatient Encounter Medications as of 12/23/2024   Medication Sig Dispense Refill    Calcium-Phosphorus-Vitamin D (CALCIUM/D3 ADULT GUMMIES PO) Take 500 mg by mouth      sertraline (ZOLOFT) 50 MG tablet Take 1 tablet by mouth daily      simvastatin (ZOCOR) 40 MG tablet Take 1 tablet by mouth nightly      Tirzepatide (MOUNJARO) 12.5 MG/0.5ML SOAJ Inject into the skin Once a week at 5 PM      metoprolol tartrate (LOPRESSOR) 25 MG tablet TAKE 1 TABLET BY MOUTH A DAY (Patient taking differently: 1 tablet as needed) 180 tablet 3    amLODIPine (NORVASC) 2.5 MG tablet Take 1 tablet by mouth daily      hydrOXYzine (VISTARIL) 25 MG capsule Take 1 capsule by mouth every 6 hours as needed      levothyroxine (SYNTHROID) 125 MCG tablet Take 100 mcg by mouth Daily      aspirin 81 MG EC tablet Take 1 tablet by mouth daily      Cholecalciferol (VITAMIN D) 2000 units CAPS capsule Take 1 capsule by mouth daily       No facility-administered encounter medications on file as of 12/23/2024.     Allergies:  Vicodin [hydrocodone-acetaminophen]     [x] Medications and dosages reviewed.    ROS:  [x]Full ROS obtained and negative except as mentioned in HPI    Physical Examination:    Vitals:    12/23/24 1056   BP: 126/78   Pulse: 88   SpO2: 97%        GENERAL: Well developed, well nourished, No acute distress, pleasant, obese  NEUROLOGICAL: Alert and oriented  PSYCH: Calm affect  SKIN: Warm and dry  HEENT: Normocephalic, Sclera non-icteric, mucus membranes moist  NECK: supple, JVP normal  CAROTID: Normal upstroke, no bruits  CARDIAC: Normal PMI, tachycardic rate and irregular rhythm, normal S1S2, no murmur, rub, or gallop  RESPIRATORY: Normal respiratory

## 2024-12-23 NOTE — PATIENT INSTRUCTIONS
No med changes  Continue to work on weight loss with healthy diet and regular exercise  Mediterranean diet has been shown to be most heart healthy diet   Follow up in 6 months

## 2025-06-11 NOTE — PROGRESS NOTES
dilated. Left atrial volume index is mildly increased (35-41 mL/m2). LA Vol Index is  39 ml/m2.    Interatrial Septum: No interatrial shunt visualized with color Doppler.    GXT: 10/14  Normal myocardial perfusion study.     Normal LV function.      Event Monitor: 4/21  NSR. Average HR 71, low 50, high 121  Symptoms with PAC, most episodes with sinus rhythm    Diet & Exercise:  The patient is counseled to follow a low salt diet to assure blood pressure remains controlled for cardiovascular risk factor modification  The patient is counseled to avoid excess caffeine, and energy drinks as this may exacerbated ectopy and arrhythmia  The patient is counseled to lose weight to control cardiovascular risk factors  Exercise program discussed: To improve overall cardiovascular health, the patient is instructed to increase cardiovascular related activities with a goal of 150 min/week of moderate level activity or 10,000 steps per day. Encouraged to perform as much activity as tolerated     I have addressed the patient's cardiac risk factors and adjusted pharmacologic treatment as needed. In addition, I have reinforced the need for patient directed risk factor modification.    I independently reviewed the ECG    All questions and concerns were addressed with the patient. Alternatives to treatment were discussed.     Thank you for allowing to us to participate in the care of RA[facility] [place]   Office: [phone]

## 2025-06-16 ENCOUNTER — OFFICE VISIT (OUTPATIENT)
Dept: CARDIOLOGY CLINIC | Age: 65
End: 2025-06-16
Payer: MEDICARE

## 2025-06-16 VITALS
HEIGHT: 63 IN | SYSTOLIC BLOOD PRESSURE: 130 MMHG | BODY MASS INDEX: 34.55 KG/M2 | DIASTOLIC BLOOD PRESSURE: 74 MMHG | WEIGHT: 195 LBS | HEART RATE: 72 BPM | OXYGEN SATURATION: 97 %

## 2025-06-16 DIAGNOSIS — I48.0 PAROXYSMAL ATRIAL FIBRILLATION (HCC): Primary | Chronic | ICD-10-CM

## 2025-06-16 DIAGNOSIS — E78.2 MIXED HYPERLIPIDEMIA: Chronic | ICD-10-CM

## 2025-06-16 DIAGNOSIS — G47.33 OSA ON CPAP: ICD-10-CM

## 2025-06-16 DIAGNOSIS — I48.4 ATYPICAL ATRIAL FLUTTER (HCC): ICD-10-CM

## 2025-06-16 DIAGNOSIS — R06.02 SOB (SHORTNESS OF BREATH): ICD-10-CM

## 2025-06-16 PROCEDURE — 3075F SYST BP GE 130 - 139MM HG: CPT | Performed by: INTERNAL MEDICINE

## 2025-06-16 PROCEDURE — 3078F DIAST BP <80 MM HG: CPT | Performed by: INTERNAL MEDICINE

## 2025-06-16 PROCEDURE — 99214 OFFICE O/P EST MOD 30 MIN: CPT | Performed by: INTERNAL MEDICINE

## 2025-06-16 PROCEDURE — 1159F MED LIST DOCD IN RCRD: CPT | Performed by: INTERNAL MEDICINE

## 2025-06-16 PROCEDURE — 1123F ACP DISCUSS/DSCN MKR DOCD: CPT | Performed by: INTERNAL MEDICINE

## 2025-06-16 NOTE — PROGRESS NOTES
Cardiac Follow up       Referring Provider:  Steff Hermosillo MD     Chief Complaint   Patient presents with    6 Month Follow-Up    Hypertension    Atrial Fibrillation      History of Present Illness:  Ms. Becki Raymundo is a 64 y.o.  female here today for evaluation of chest pain and dyspnea.    She has a history of atrial fibrillation and is followed in the EP clinic.  She has had 2 ablations the first in 2016 and the second in 2019.  She has not had any recurrent atrial fibrillation since 2019.  She does use a [employer] mobile at home and has not had any atrial fibrillation.    She has been on Mounjaro and has lost about 50 pounds.  She says a few weeks ago she began having some tightness and congestion in her chest.  Also some shortness of breath exacerbated by exertion.  The chest tightness does seem to be somewhat positional and was related to laying on her left side at times.  The shortness of breath is moderate in intensity.  She has no lower extremity edema.  She does have a history of sleep apnea and does use CPAP.  She is a former smoker but quit smoking many years ago.    She had a stress Myoview 12/2024 that was normal. She no longer has chest pain. Continues to lose weigh on Mounjaro.  She is wearing her CPAP and follows with sleep medicine on a regular basis.  She has an appointment with EP next month.    Past Medical History:   has a past medical history of A-fib (HCC), Anxiety, CHF (congestive heart failure) (HCC), COPD (chronic obstructive pulmonary disease) (HCC), Depression, Diabetes mellitus (HCC), Heart palpitations, Hyperlipidemia, Hypertension, Sleep apnea, and Urinary incontinence.    Surgical History:   has a past surgical history that includes Carpal tunnel release; cyst removal; Atrial ablation surgery; thoracotomy; and Breast biopsy.     Social History:   reports that she quit smoking about 15 years ago. Her smoking use included cigarettes. She has never used smokeless tobacco. She reports

## 2025-07-09 ENCOUNTER — OFFICE VISIT (OUTPATIENT)
Dept: CARDIOLOGY CLINIC | Age: 65
End: 2025-07-09
Payer: MEDICARE

## 2025-07-09 VITALS
BODY MASS INDEX: 33.56 KG/M2 | HEART RATE: 81 BPM | WEIGHT: 189.4 LBS | OXYGEN SATURATION: 96 % | SYSTOLIC BLOOD PRESSURE: 116 MMHG | HEIGHT: 63 IN | DIASTOLIC BLOOD PRESSURE: 72 MMHG

## 2025-07-09 DIAGNOSIS — G47.33 OSA ON CPAP: ICD-10-CM

## 2025-07-09 DIAGNOSIS — I48.0 PAROXYSMAL ATRIAL FIBRILLATION (HCC): Primary | Chronic | ICD-10-CM

## 2025-07-09 DIAGNOSIS — E66.01 MORBID OBESITY WITH BMI OF 40.0-44.9, ADULT (HCC): ICD-10-CM

## 2025-07-09 DIAGNOSIS — I10 ESSENTIAL HYPERTENSION: Chronic | ICD-10-CM

## 2025-07-09 DIAGNOSIS — I48.4 ATYPICAL ATRIAL FLUTTER (HCC): ICD-10-CM

## 2025-07-09 PROCEDURE — 3074F SYST BP LT 130 MM HG: CPT | Performed by: NURSE PRACTITIONER

## 2025-07-09 PROCEDURE — 99214 OFFICE O/P EST MOD 30 MIN: CPT | Performed by: NURSE PRACTITIONER

## 2025-07-09 PROCEDURE — 1159F MED LIST DOCD IN RCRD: CPT | Performed by: NURSE PRACTITIONER

## 2025-07-09 PROCEDURE — 3078F DIAST BP <80 MM HG: CPT | Performed by: NURSE PRACTITIONER

## 2025-07-09 PROCEDURE — 93000 ELECTROCARDIOGRAM COMPLETE: CPT | Performed by: NURSE PRACTITIONER

## 2025-07-09 PROCEDURE — 1123F ACP DISCUSS/DSCN MKR DOCD: CPT | Performed by: NURSE PRACTITIONER

## 2025-07-09 PROCEDURE — G2211 COMPLEX E/M VISIT ADD ON: HCPCS | Performed by: NURSE PRACTITIONER

## 2025-07-09 PROCEDURE — 1160F RVW MEDS BY RX/DR IN RCRD: CPT | Performed by: NURSE PRACTITIONER

## 2025-07-09 RX ORDER — METOPROLOL TARTRATE 25 MG/1
25 TABLET, FILM COATED ORAL 2 TIMES DAILY PRN
Qty: 180 TABLET | Refills: 3 | Status: SHIPPED
Start: 2025-07-09